# Patient Record
Sex: FEMALE | Race: WHITE | NOT HISPANIC OR LATINO | Employment: OTHER | URBAN - METROPOLITAN AREA
[De-identification: names, ages, dates, MRNs, and addresses within clinical notes are randomized per-mention and may not be internally consistent; named-entity substitution may affect disease eponyms.]

---

## 2018-05-16 ENCOUNTER — PATIENT OUTREACH (OUTPATIENT)
Dept: ADMINISTRATIVE | Facility: HOSPITAL | Age: 63
End: 2018-05-16

## 2018-05-16 NOTE — PROGRESS NOTES
Health Maintenance Due   Topic Date Due    Hepatitis C Screening  1955    Lipid Panel  1955    TETANUS VACCINE  12/01/1975    Mammogram  12/01/1997    Colonoscopy  12/01/2007    Zoster Vaccine  12/01/2017     Pre-visit outreach via mail

## 2018-05-16 NOTE — LETTER
May 16, 2018    Arti Cortés  [address] [facility] [address]             [facility]  [address]  [facility] [address]  Phone: [phone] Dear Ms. Cortés:    [facility] is committed to your overall health.  To help you get the most out of each of your visits, we will review your information to make sure you are up to date on all of your recommended tests and/or procedures.      Dr. Balalrd   has found that your chart shows you may be due for the following:    One-time Hepatitis C Screening lab test(a viral condition that can harm the liver)  Fasting cholesterol labs  Tetanus Immunization  Mammogram  Colon cancer screening  Shingles immunization    If you have had any of the above done at another facility, please bring the records or information with you so that your record at [facility] will be complete.  If you would like to schedule any of these, please contact me.    If you are currently taking medication, please bring it with you to your appointment for review.    If you have any questions or concerns, please don't hesitate to call.    Sincerely,    Perla Retana  Clinical Care Coordinator  [facility]  [address] [place] [address]  Phone: [phone]   Fax: [phone]

## 2018-05-29 ENCOUNTER — TELEPHONE (OUTPATIENT)
Dept: FAMILY MEDICINE | Facility: CLINIC | Age: 63
End: 2018-05-29

## 2018-10-05 ENCOUNTER — OFFICE VISIT (OUTPATIENT)
Dept: URGENT CARE | Facility: CLINIC | Age: 63
End: 2018-10-05
Payer: COMMERCIAL

## 2018-10-05 VITALS
DIASTOLIC BLOOD PRESSURE: 90 MMHG | RESPIRATION RATE: 18 BRPM | WEIGHT: 184 LBS | HEART RATE: 59 BPM | HEIGHT: 64 IN | OXYGEN SATURATION: 96 % | TEMPERATURE: 98 F | SYSTOLIC BLOOD PRESSURE: 148 MMHG | BODY MASS INDEX: 31.41 KG/M2

## 2018-10-05 DIAGNOSIS — J20.9 ACUTE BRONCHITIS WITH BRONCHOSPASM: Primary | ICD-10-CM

## 2018-10-05 PROCEDURE — 94640 AIRWAY INHALATION TREATMENT: CPT | Mod: S$GLB,,, | Performed by: FAMILY MEDICINE

## 2018-10-05 PROCEDURE — 3008F BODY MASS INDEX DOCD: CPT | Mod: CPTII,S$GLB,, | Performed by: FAMILY MEDICINE

## 2018-10-05 PROCEDURE — 99214 OFFICE O/P EST MOD 30 MIN: CPT | Mod: 25,S$GLB,, | Performed by: FAMILY MEDICINE

## 2018-10-05 PROCEDURE — 96372 THER/PROPH/DIAG INJ SC/IM: CPT | Mod: 59,S$GLB,, | Performed by: FAMILY MEDICINE

## 2018-10-05 RX ORDER — BETAMETHASONE SODIUM PHOSPHATE AND BETAMETHASONE ACETATE 3; 3 MG/ML; MG/ML
6 INJECTION, SUSPENSION INTRA-ARTICULAR; INTRALESIONAL; INTRAMUSCULAR; SOFT TISSUE
Status: COMPLETED | OUTPATIENT
Start: 2018-10-05 | End: 2018-10-05

## 2018-10-05 RX ORDER — ALBUTEROL SULFATE 0.83 MG/ML
2.5 SOLUTION RESPIRATORY (INHALATION)
Status: COMPLETED | OUTPATIENT
Start: 2018-10-05 | End: 2018-10-05

## 2018-10-05 RX ORDER — PREDNISONE 20 MG/1
40 TABLET ORAL DAILY
Qty: 10 TABLET | Refills: 0 | Status: SHIPPED | OUTPATIENT
Start: 2018-10-05 | End: 2018-10-10

## 2018-10-05 RX ORDER — ALBUTEROL SULFATE 90 UG/1
2 AEROSOL, METERED RESPIRATORY (INHALATION) EVERY 4 HOURS PRN
Qty: 18 G | Refills: 3 | Status: SHIPPED | OUTPATIENT
Start: 2018-10-05 | End: 2023-01-01 | Stop reason: CLARIF

## 2018-10-05 RX ORDER — IPRATROPIUM BROMIDE 0.5 MG/2.5ML
0.5 SOLUTION RESPIRATORY (INHALATION)
Status: COMPLETED | OUTPATIENT
Start: 2018-10-05 | End: 2018-10-05

## 2018-10-05 RX ORDER — DOXYCYCLINE 100 MG/1
100 CAPSULE ORAL 2 TIMES DAILY
Qty: 20 CAPSULE | Refills: 0 | Status: SHIPPED | OUTPATIENT
Start: 2018-10-05 | End: 2018-10-15

## 2018-10-05 RX ADMIN — ALBUTEROL SULFATE 2.5 MG: 0.83 SOLUTION RESPIRATORY (INHALATION) at 03:10

## 2018-10-05 RX ADMIN — BETAMETHASONE SODIUM PHOSPHATE AND BETAMETHASONE ACETATE 6 MG: 3; 3 INJECTION, SUSPENSION INTRA-ARTICULAR; INTRALESIONAL; INTRAMUSCULAR; SOFT TISSUE at 03:10

## 2018-10-05 RX ADMIN — IPRATROPIUM BROMIDE 0.5 MG: 0.5 SOLUTION RESPIRATORY (INHALATION) at 03:10

## 2018-10-05 NOTE — PATIENT INSTRUCTIONS
Bronchitis, Antibiotic Treatment (Adult)    Bronchitis is an infection of the air passages (bronchial tubes) in your lungs. It often occurs when you have a cold. This illness is contagious during the first few days and is spread through the air by coughing and sneezing, or by direct contact (touching the sick person and then touching your own eyes, nose, or mouth).  Symptoms of bronchitis include cough with mucus (phlegm) and low-grade fever. Bronchitis usually lasts 7 to 14 days. Mild cases can be treated with simple home remedies. More severe infection is treated with an antibiotic.  Home care  Follow these guidelines when caring for yourself at home:  · If your symptoms are severe, rest at home for the first 2 to 3 days. When you go back to your usual activities, don't let yourself get too tired.  · Do not smoke. Also avoid being exposed to secondhand smoke.  · You may use over-the-counter medicines to control fever or pain, unless another medicine was prescribed. (Note: If you have chronic liver or kidney disease or have ever had a stomach ulcer or gastrointestinal bleeding, talk with your healthcare provider before using these medicines. Also talk to your provider if you are taking medicine to prevent blood clots.) Aspirin should never be given to anyone younger than 18 years of age who is ill with a viral infection or fever. It may cause severe liver or brain damage.  · Your appetite may be poor, so a light diet is fine. Avoid dehydration by drinking 6 to 8 glasses of fluids per day (such as water, soft drinks, sports drinks, juices, tea, or soup). Extra fluids will help loosen secretions in the nose and lungs.  · Over-the-counter cough, cold, and sore-throat medicines will not shorten the length of the illness, but they may be helpful to reduce symptoms. (Note: Do not use decongestants if you have high blood pressure.)  · Finish all antibiotic medicine. Do this even if you are feeling better after only a  few days.  Follow-up care  Follow up with your healthcare provider, or as advised. If you had an X-ray or ECG (electrocardiogram), a specialist will review it. You will be notified of any new findings that may affect your care.  Note: If you are age 65 or older, or if you have a chronic lung disease or condition that affects your immune system, or you smoke, talk to your healthcare provider about having pneumococcal vaccinations and a yearly influenza vaccination (flu shot).  When to seek medical advice  Call your healthcare provider right away if any of these occur:  · Fever of 100.4°F (38°C) or higher  · Coughing up increased amounts of colored sputum  · Weakness, drowsiness, headache, facial pain, ear pain, or a stiff neck  Call 911, or get immediate medical care  Contact emergency services right away if any of these occur.  · Coughing up blood  · Worsening weakness, drowsiness, headache, or stiff neck  · Trouble breathing, wheezing, or pain with breathing  Date Last Reviewed: 9/13/2015  © 7184-1524 [employer]. [address] [place] [address], [place] [address]. All rights reserved. This information is not intended as a substitute for professional medical care. Always follow your healthcare professional's instructions.

## 2018-10-05 NOTE — PROGRESS NOTES
"Subjective:       Patient ID: Arti Cortés is a 62 y.o. female.    Vitals:  height is 5' 4" (1.626 m) and weight is 83.5 kg (184 lb). Her temperature is 97.9 °F (36.6 °C). Her blood pressure is 148/90 (abnormal) and her pulse is 59 (abnormal). Her respiration is 18 and oxygen saturation is 96%.     Chief Complaint: URI (pt has been sick for 2 days)    URI    This is a new problem. The current episode started in the past 7 days. The problem has been gradually worsening. There has been no fever. Associated symptoms include coughing and headaches. Pertinent negatives include no abdominal pain, chest pain, congestion, ear pain, nausea, sore throat or wheezing. She has tried acetaminophen for the symptoms. The treatment provided mild relief.     Review of Systems   Constitution: Negative for chills, fever and malaise/fatigue.   HENT: Negative for congestion, ear pain, hoarse voice and sore throat.    Eyes: Negative for discharge and redness.   Cardiovascular: Negative for chest pain, dyspnea on exertion and leg swelling.   Respiratory: Positive for cough and shortness of breath. Negative for sputum production and wheezing.    Musculoskeletal: Negative for myalgias.   Gastrointestinal: Negative for abdominal pain and nausea.   Neurological: Positive for headaches.       Objective:      Physical Exam   Constitutional: She is oriented to person, place, and time. She appears well-developed and well-nourished. She is cooperative.  Non-toxic appearance. She does not appear ill. No distress.   HENT:   Head: Normocephalic and atraumatic.   Right Ear: Hearing, tympanic membrane, external ear and ear canal normal.   Left Ear: Hearing, tympanic membrane, external ear and ear canal normal.   Nose: Nose normal. No mucosal edema, rhinorrhea or nasal deformity. No epistaxis. Right sinus exhibits no maxillary sinus tenderness and no frontal sinus tenderness. Left sinus exhibits no maxillary sinus tenderness and no frontal sinus " tenderness.   Mouth/Throat: Uvula is midline, oropharynx is clear and moist and mucous membranes are normal. No trismus in the jaw. Normal dentition. No uvula swelling. No posterior oropharyngeal erythema.   Eyes: Conjunctivae and lids are normal. No scleral icterus.   Sclera clear bilat   Neck: Trachea normal, full passive range of motion without pain and phonation normal. Neck supple.   Cardiovascular: Normal rate, regular rhythm, normal heart sounds, intact distal pulses and normal pulses.   Pulmonary/Chest: Effort normal. She has wheezes. She has no rales. She exhibits no tenderness.   Abdominal: Soft. Normal appearance and bowel sounds are normal. She exhibits no distension. There is no tenderness.   Musculoskeletal: Normal range of motion. She exhibits no edema or deformity.   Neurological: She is alert and oriented to person, place, and time. She exhibits normal muscle tone. Coordination normal.   Skin: Skin is warm, dry and intact. She is not diaphoretic. No pallor.   Psychiatric: She has a normal mood and affect. Her speech is normal and behavior is normal. Judgment and thought content normal. Cognition and memory are normal.   Nursing note and vitals reviewed.      Assessment:       1. Acute bronchitis with bronchospasm        Plan:         Acute bronchitis with bronchospasm  -     Ambulatory referral to Smoking Cessation Program    Other orders  -     albuterol nebulizer solution 2.5 mg; Take 3 mLs (2.5 mg total) by nebulization one time.  -     ipratropium 0.02 % nebulizer solution 0.5 mg; Take 2.5 mLs (0.5 mg total) by nebulization one time.  -     betamethasone acetate-betamethasone sodium phosphate injection 6 mg; Inject 1 mL (6 mg total) into the muscle one time.  -     doxycycline (VIBRAMYCIN) 100 MG Cap; Take 1 capsule (100 mg total) by mouth 2 (two) times daily. for 10 days  Dispense: 20 capsule; Refill: 0  -     predniSONE (DELTASONE) 20 MG tablet; Take 2 tablets (40 mg total) by mouth once  daily. for 5 days  Dispense: 10 tablet; Refill: 0  -     albuterol (PROVENTIL/VENTOLIN HFA) 90 mcg/actuation inhaler; Inhale 2 puffs into the lungs every 4 (four) hours as needed for Wheezing. Rescue  Dispense: 18 g; Refill: 3

## 2019-04-11 ENCOUNTER — OFFICE VISIT (OUTPATIENT)
Dept: URGENT CARE | Facility: CLINIC | Age: 64
End: 2019-04-11
Payer: COMMERCIAL

## 2019-04-11 VITALS
DIASTOLIC BLOOD PRESSURE: 73 MMHG | SYSTOLIC BLOOD PRESSURE: 103 MMHG | WEIGHT: 184 LBS | OXYGEN SATURATION: 95 % | RESPIRATION RATE: 18 BRPM | TEMPERATURE: 99 F | HEART RATE: 72 BPM | BODY MASS INDEX: 31.41 KG/M2 | HEIGHT: 64 IN

## 2019-04-11 DIAGNOSIS — R05.9 COUGH: ICD-10-CM

## 2019-04-11 DIAGNOSIS — J01.90 ACUTE RHINOSINUSITIS: ICD-10-CM

## 2019-04-11 DIAGNOSIS — R68.89 FLU-LIKE SYMPTOMS: Primary | ICD-10-CM

## 2019-04-11 LAB
CTP QC/QA: YES
FLUAV AG NPH QL: NEGATIVE
FLUBV AG NPH QL: NEGATIVE

## 2019-04-11 PROCEDURE — 3008F BODY MASS INDEX DOCD: CPT | Mod: CPTII,S$GLB,, | Performed by: NURSE PRACTITIONER

## 2019-04-11 PROCEDURE — 96372 PR INJECTION,THERAP/PROPH/DIAG2ST, IM OR SUBCUT: ICD-10-PCS | Mod: S$GLB,,, | Performed by: NURSE PRACTITIONER

## 2019-04-11 PROCEDURE — 87804 POCT INFLUENZA A/B: ICD-10-PCS | Mod: 59,QW,S$GLB, | Performed by: NURSE PRACTITIONER

## 2019-04-11 PROCEDURE — 99214 PR OFFICE/OUTPT VISIT, EST, LEVL IV, 30-39 MIN: ICD-10-PCS | Mod: 25,S$GLB,, | Performed by: NURSE PRACTITIONER

## 2019-04-11 PROCEDURE — 3008F PR BODY MASS INDEX (BMI) DOCUMENTED: ICD-10-PCS | Mod: CPTII,S$GLB,, | Performed by: NURSE PRACTITIONER

## 2019-04-11 PROCEDURE — 87804 INFLUENZA ASSAY W/OPTIC: CPT | Mod: QW,S$GLB,, | Performed by: NURSE PRACTITIONER

## 2019-04-11 PROCEDURE — 99214 OFFICE O/P EST MOD 30 MIN: CPT | Mod: 25,S$GLB,, | Performed by: NURSE PRACTITIONER

## 2019-04-11 PROCEDURE — 96372 THER/PROPH/DIAG INJ SC/IM: CPT | Mod: S$GLB,,, | Performed by: NURSE PRACTITIONER

## 2019-04-11 RX ORDER — FLUTICASONE PROPIONATE 50 MCG
1 SPRAY, SUSPENSION (ML) NASAL DAILY
Qty: 1 BOTTLE | Refills: 0 | Status: SHIPPED | OUTPATIENT
Start: 2019-04-11 | End: 2023-01-01 | Stop reason: CLARIF

## 2019-04-11 RX ORDER — PREDNISONE 20 MG/1
20 TABLET ORAL DAILY
Qty: 5 TABLET | Refills: 0 | Status: SHIPPED | OUTPATIENT
Start: 2019-04-11 | End: 2019-04-16

## 2019-04-11 RX ORDER — BETAMETHASONE SODIUM PHOSPHATE AND BETAMETHASONE ACETATE 3; 3 MG/ML; MG/ML
6 INJECTION, SUSPENSION INTRA-ARTICULAR; INTRALESIONAL; INTRAMUSCULAR; SOFT TISSUE
Status: COMPLETED | OUTPATIENT
Start: 2019-04-11 | End: 2019-04-11

## 2019-04-11 RX ADMIN — BETAMETHASONE SODIUM PHOSPHATE AND BETAMETHASONE ACETATE 6 MG: 3; 3 INJECTION, SUSPENSION INTRA-ARTICULAR; INTRALESIONAL; INTRAMUSCULAR; SOFT TISSUE at 10:04

## 2019-04-11 NOTE — LETTER
April 11, 2019      [facility] [address], [address]  [facility] [address]  Phone: [phone]  Fax: [phone]       Patient: Arti Cortés   YOB: 1955  Date of Visit: 04/11/2019    To Whom It May Concern:    Florian Cortés  was at [facility] on 04/11/2019. She may return to work/school on 4/15/2019  with no restrictions. If you have any questions or concerns, or if I can be of further assistance, please do not hesitate to contact me.    Sincerely,        Chery Tracey NP

## 2019-04-11 NOTE — PATIENT INSTRUCTIONS
Follow up with your doctor in a few days.  Return to the urgent care or go to the ER if symptoms get worse.    You received a steroid shot today - this can elevate your blood pressure, elevate your blood sugar, water weight gain, nervous energy, redness to the face and dimpling of the skin where the shot goes in.     START ORAL STEROID TOMORROW.      WARM SALT WATER GARGLES FOR THROAT DISCOMFORT.    TYLENOL EVERY 4 HOURS OR/AND MOTRIN 600MG EVERY 6 HOURS FOR THROAT PAIN/FEVER.    TAKE DAILY ANTIHISTAMINE WITH DECONGESTANT (CLARATIN D, ALLEGRA D, ZYRTEC D) FOR THE NEXT 7-10 DAYS. Take in daytime.     TAKE DAILY FLONASE AS DIRECTED FOR THE NEXT 7-10 DAYS FOR CONGESTION.    CONTINUE MUCINCEX DM FOR CHEST CONGESTION. (GUAFINASEN)       SEE HANDOUT ON SINUSITUS AND ALLERGIC RHINITIS.    IF SYMPTOMS FAIL TO IMPROVE IN THE NEXT 5-7 DAYS, OR IMPROVE AND THEN WORSEN, FOLLOW UP WITH A HEALTHCARE PROVIDER.        Sinusitis (No Antibiotics)    The sinuses are air-filled spaces within the bones of the face. They connect to the inside of the nose. Sinusitis is an inflammation of the tissue lining the sinus cavity. Sinus inflammation can occur during a cold. It can also be due to allergies to pollens and other particles in the air. It can cause symptoms such as sinus congestion, headache, sore throat, facial swelling and fullness. It may also cause a low-grade fever. No infection is present, and no antibiotic treatment is needed.  Home care  · Drink plenty of water, hot tea, and other liquids. This may help thin mucus. It also may promote sinus drainage.  · Heat may help soothe painful areas of the face. Use a towel soaked in hot water. Or,  the shower and direct the hot spray onto your face. Using a vaporizer along with a menthol rub at night may also help.   · An expectorant containing guaifenesin may help thin the mucus and promote drainage from the sinuses.  · Over-the-counter decongestants may be used unless a similar  medicine was prescribed. Nasal sprays work the fastest. Use one that contains phenylephrine or oxymetazoline. First blow the nose gently. Then use the spray. Do not use these medicines more often than directed on the label or symptoms may get worse. You may also use tablets containing pseudoephedrine. Avoid products that combine ingredients, because side effects may be increased. Read labels. You can also ask the pharmacist for help. (NOTE: Persons with high blood pressure should not use decongestants. They can raise blood pressure.)  · Over-the-counter antihistamines may help if allergies contributed to your sinusitis.    · Use acetaminophen or ibuprofen to control pain, unless another pain medicine was prescribed. (If you have chronic liver or kidney disease or ever had a stomach ulcer, talk with your doctor before using these medicines. Aspirin should never be used in anyone under 18 years of age who is ill with a fever. It may cause severe liver damage.)  · Use nasal rinses or irrigation as instructed by your health care provider.  · Don't smoke. This can worsen symptoms.  Follow-up care  Follow up with your healthcare provider or our staff if you are not improving within the next week.  When to seek medical advice  Call your healthcare provider if any of these occur:  · Green or yellow discharge from the nose or into the throat  · Facial pain or headache becoming more severe  · Stiff neck  · Unusual drowsiness or confusion  · Swelling of the forehead or eyelids  · Vision problems, including blurred or double vision  · Fever of 100.4ºF (38ºC) or higher, or as directed by your healthcare provider  · Seizure  · Breathing problems  · Symptoms not resolving within 10 days  Date Last Reviewed: 4/13/2015  © 5367-4577 [employer]. [address] [place] [address], [place] [address]. All rights reserved. This information is not intended as a substitute for professional medical care. Always follow your healthcare  professional's instructions.

## 2019-04-11 NOTE — PROGRESS NOTES
"Subjective:       Patient ID: Arti Cortés is a 63 y.o. female.    Vitals:  height is 5' 4" (1.626 m) and weight is 83.5 kg (184 lb). Her oral temperature is 98.8 °F (37.1 °C). Her blood pressure is 103/73 and her pulse is 72. Her respiration is 18 and oxygen saturation is 95%.     Chief Complaint: Cough (X's 1 week); Sore Throat; and Fever    Pt presents today with cough, sore throat and low grade fever X's 1 week. EVERYDAY SMOKER. DENIES PRODUCTIVE COUGHING. POSITIVE FOR DRY COUGH AND SINUS PRESSURE/HEADACHE, CONGESTION. SHE DID NOT MEASURE HER TEMPERATURE-FELT "FEVERISH". DENIES SOB, WHEEZING.    Cough   This is a new problem. The current episode started in the past 7 days. The problem has been gradually worsening. The problem occurs constantly. The cough is non-productive. Associated symptoms include a fever and a sore throat. Pertinent negatives include no chills, ear pain, eye redness, hemoptysis, myalgias, rash, shortness of breath or wheezing. Nothing aggravates the symptoms. She has tried OTC cough suppressant for the symptoms. The treatment provided no relief. Her past medical history is significant for asthma.   Sore Throat    Associated symptoms include coughing. Pertinent negatives include no congestion, ear pain, shortness of breath, stridor or vomiting.   Fever    Associated symptoms include coughing and a sore throat. Pertinent negatives include no congestion, ear pain, nausea, rash, vomiting or wheezing.       Constitution: Positive for fatigue and fever. Negative for chills and sweating.   HENT: Positive for sore throat. Negative for ear pain, congestion, sinus pain, sinus pressure and voice change.    Neck: Negative for painful lymph nodes.   Eyes: Negative for eye redness.   Respiratory: Positive for cough. Negative for chest tightness, sputum production, bloody sputum, COPD, shortness of breath, stridor, wheezing and asthma.    Gastrointestinal: Negative for nausea and vomiting. "   Musculoskeletal: Negative for muscle ache.   Skin: Negative for rash.   Allergic/Immunologic: Negative for seasonal allergies and asthma.   Hematologic/Lymphatic: Negative for swollen lymph nodes.       Objective:      Physical Exam   Constitutional: She is oriented to person, place, and time. She appears well-developed and well-nourished. She is cooperative.  Non-toxic appearance. She does not appear ill. No distress.   HENT:   Head: Normocephalic and atraumatic.   Right Ear: Hearing, tympanic membrane, external ear and ear canal normal.   Left Ear: Hearing, tympanic membrane, external ear and ear canal normal.   Nose: Mucosal edema and rhinorrhea present. No nasal deformity. No epistaxis. Right sinus exhibits no maxillary sinus tenderness and no frontal sinus tenderness. Left sinus exhibits no maxillary sinus tenderness and no frontal sinus tenderness.   Mouth/Throat: Uvula is midline and mucous membranes are normal. No trismus in the jaw. Normal dentition. No uvula swelling. Posterior oropharyngeal edema and posterior oropharyngeal erythema present. No oropharyngeal exudate. No tonsillar exudate.   Eyes: Conjunctivae and lids are normal. No scleral icterus.   Sclera clear bilat   Neck: Trachea normal, full passive range of motion without pain and phonation normal. Neck supple.   Cardiovascular: Normal rate, regular rhythm, normal heart sounds, intact distal pulses and normal pulses.   Pulmonary/Chest: Effort normal and breath sounds normal. No stridor. No respiratory distress. She has no decreased breath sounds. She has no wheezes. She has no rhonchi.   Abdominal: Soft. Normal appearance and bowel sounds are normal. She exhibits no distension. There is no tenderness.   Musculoskeletal: Normal range of motion. She exhibits no edema or deformity.   Lymphadenopathy:     She has cervical adenopathy.        Right cervical: Superficial cervical adenopathy present.        Left cervical: Superficial cervical adenopathy  present.   Neurological: She is alert and oriented to person, place, and time. She exhibits normal muscle tone. Coordination normal.   Skin: Skin is warm, dry and intact. She is not diaphoretic. No pallor.   Psychiatric: She has a normal mood and affect. Her speech is normal and behavior is normal. Judgment and thought content normal. Cognition and memory are normal.   Nursing note and vitals reviewed.      Assessment:       1. Flu-like symptoms    2. Acute rhinosinusitis    3. Cough        Plan:         Flu-like symptoms  -     POCT Influenza A/B    Acute rhinosinusitis  -     fluticasone (FLONASE) 50 mcg/actuation nasal spray; 1 spray (50 mcg total) by Each Nare route once daily.  Dispense: 1 Bottle; Refill: 0  -     betamethasone acetate-betamethasone sodium phosphate injection 6 mg  -     predniSONE (DELTASONE) 20 MG tablet; Take 1 tablet (20 mg total) by mouth once daily. for 5 days  Dispense: 5 tablet; Refill: 0    Cough      Patient Instructions   Follow up with your doctor in a few days.  Return to the urgent care or go to the ER if symptoms get worse.    You received a steroid shot today - this can elevate your blood pressure, elevate your blood sugar, water weight gain, nervous energy, redness to the face and dimpling of the skin where the shot goes in.     START ORAL STEROID TOMORROW.      WARM SALT WATER GARGLES FOR THROAT DISCOMFORT.    TYLENOL EVERY 4 HOURS OR/AND MOTRIN 600MG EVERY 6 HOURS FOR THROAT PAIN/FEVER.    TAKE DAILY ANTIHISTAMINE WITH DECONGESTANT (CLARATIN D, ALLEGRA D, ZYRTEC D) FOR THE NEXT 7-10 DAYS. Take in daytime.     TAKE DAILY FLONASE AS DIRECTED FOR THE NEXT 7-10 DAYS FOR CONGESTION.    CONTINUE MUCINCEX DM FOR CHEST CONGESTION. (GUAFINASEN)       SEE HANDOUT ON SINUSITUS AND ALLERGIC RHINITIS.    IF SYMPTOMS FAIL TO IMPROVE IN THE NEXT 5-7 DAYS, OR IMPROVE AND THEN WORSEN, FOLLOW UP WITH A HEALTHCARE PROVIDER.        Sinusitis (No Antibiotics)    The sinuses are air-filled spaces  within the bones of the face. They connect to the inside of the nose. Sinusitis is an inflammation of the tissue lining the sinus cavity. Sinus inflammation can occur during a cold. It can also be due to allergies to pollens and other particles in the air. It can cause symptoms such as sinus congestion, headache, sore throat, facial swelling and fullness. It may also cause a low-grade fever. No infection is present, and no antibiotic treatment is needed.  Home care  · Drink plenty of water, hot tea, and other liquids. This may help thin mucus. It also may promote sinus drainage.  · Heat may help soothe painful areas of the face. Use a towel soaked in hot water. Or,  the shower and direct the hot spray onto your face. Using a vaporizer along with a menthol rub at night may also help.   · An expectorant containing guaifenesin may help thin the mucus and promote drainage from the sinuses.  · Over-the-counter decongestants may be used unless a similar medicine was prescribed. Nasal sprays work the fastest. Use one that contains phenylephrine or oxymetazoline. First blow the nose gently. Then use the spray. Do not use these medicines more often than directed on the label or symptoms may get worse. You may also use tablets containing pseudoephedrine. Avoid products that combine ingredients, because side effects may be increased. Read labels. You can also ask the pharmacist for help. (NOTE: Persons with high blood pressure should not use decongestants. They can raise blood pressure.)  · Over-the-counter antihistamines may help if allergies contributed to your sinusitis.    · Use acetaminophen or ibuprofen to control pain, unless another pain medicine was prescribed. (If you have chronic liver or kidney disease or ever had a stomach ulcer, talk with your doctor before using these medicines. Aspirin should never be used in anyone under 18 years of age who is ill with a fever. It may cause severe liver damage.)  · Use  nasal rinses or irrigation as instructed by your health care provider.  · Don't smoke. This can worsen symptoms.  Follow-up care  Follow up with your healthcare provider or our staff if you are not improving within the next week.  When to seek medical advice  Call your healthcare provider if any of these occur:  · Green or yellow discharge from the nose or into the throat  · Facial pain or headache becoming more severe  · Stiff neck  · Unusual drowsiness or confusion  · Swelling of the forehead or eyelids  · Vision problems, including blurred or double vision  · Fever of 100.4ºF (38ºC) or higher, or as directed by your healthcare provider  · Seizure  · Breathing problems  · Symptoms not resolving within 10 days  Date Last Reviewed: 4/13/2015  © 5174-8636 [employer], [employer]. [address] [place] [address], [place] [address]. All rights reserved. This information is not intended as a substitute for professional medical care. Always follow your healthcare professional's instructions.

## 2019-04-18 ENCOUNTER — OFFICE VISIT (OUTPATIENT)
Dept: URGENT CARE | Facility: CLINIC | Age: 64
End: 2019-04-18
Payer: COMMERCIAL

## 2019-04-18 VITALS
BODY MASS INDEX: 31.41 KG/M2 | WEIGHT: 184 LBS | HEART RATE: 74 BPM | SYSTOLIC BLOOD PRESSURE: 109 MMHG | RESPIRATION RATE: 18 BRPM | TEMPERATURE: 98 F | HEIGHT: 64 IN | OXYGEN SATURATION: 96 % | DIASTOLIC BLOOD PRESSURE: 65 MMHG

## 2019-04-18 DIAGNOSIS — F17.200 SMOKER: ICD-10-CM

## 2019-04-18 DIAGNOSIS — J20.9 ACUTE BRONCHITIS WITH BRONCHOSPASM: Primary | ICD-10-CM

## 2019-04-18 PROCEDURE — 96372 THER/PROPH/DIAG INJ SC/IM: CPT | Mod: 59,S$GLB,, | Performed by: NURSE PRACTITIONER

## 2019-04-18 PROCEDURE — 94640 AIRWAY INHALATION TREATMENT: CPT | Mod: S$GLB,,, | Performed by: NURSE PRACTITIONER

## 2019-04-18 PROCEDURE — 94640 PR INHAL RX, AIRWAY OBST/DX SPUTUM INDUCT: ICD-10-PCS | Mod: S$GLB,,, | Performed by: NURSE PRACTITIONER

## 2019-04-18 PROCEDURE — 96372 PR INJECTION,THERAP/PROPH/DIAG2ST, IM OR SUBCUT: ICD-10-PCS | Mod: 59,S$GLB,, | Performed by: NURSE PRACTITIONER

## 2019-04-18 PROCEDURE — 3008F PR BODY MASS INDEX (BMI) DOCUMENTED: ICD-10-PCS | Mod: CPTII,S$GLB,, | Performed by: NURSE PRACTITIONER

## 2019-04-18 PROCEDURE — 3008F BODY MASS INDEX DOCD: CPT | Mod: CPTII,S$GLB,, | Performed by: NURSE PRACTITIONER

## 2019-04-18 PROCEDURE — 99214 OFFICE O/P EST MOD 30 MIN: CPT | Mod: 25,S$GLB,, | Performed by: NURSE PRACTITIONER

## 2019-04-18 PROCEDURE — 99214 PR OFFICE/OUTPT VISIT, EST, LEVL IV, 30-39 MIN: ICD-10-PCS | Mod: 25,S$GLB,, | Performed by: NURSE PRACTITIONER

## 2019-04-18 RX ORDER — ALBUTEROL SULFATE 0.83 MG/ML
2.5 SOLUTION RESPIRATORY (INHALATION)
Status: COMPLETED | OUTPATIENT
Start: 2019-04-18 | End: 2019-04-18

## 2019-04-18 RX ORDER — DOXYCYCLINE 100 MG/1
100 CAPSULE ORAL 2 TIMES DAILY
Qty: 20 CAPSULE | Refills: 0 | Status: SHIPPED | OUTPATIENT
Start: 2019-04-18 | End: 2019-04-28

## 2019-04-18 RX ORDER — IPRATROPIUM BROMIDE 0.5 MG/2.5ML
0.5 SOLUTION RESPIRATORY (INHALATION)
Status: COMPLETED | OUTPATIENT
Start: 2019-04-18 | End: 2019-04-18

## 2019-04-18 RX ORDER — PREDNISONE 20 MG/1
40 TABLET ORAL
Status: COMPLETED | OUTPATIENT
Start: 2019-04-18 | End: 2019-04-18

## 2019-04-18 RX ORDER — BENZONATATE 200 MG/1
200 CAPSULE ORAL 3 TIMES DAILY PRN
Qty: 30 CAPSULE | Refills: 0 | Status: SHIPPED | OUTPATIENT
Start: 2019-04-18 | End: 2019-04-28

## 2019-04-18 RX ORDER — PREDNISONE 20 MG/1
40 TABLET ORAL DAILY
Qty: 6 TABLET | Refills: 0 | Status: SHIPPED | OUTPATIENT
Start: 2019-04-19 | End: 2019-04-22

## 2019-04-18 RX ADMIN — ALBUTEROL SULFATE 2.5 MG: 0.83 SOLUTION RESPIRATORY (INHALATION) at 09:04

## 2019-04-18 RX ADMIN — IPRATROPIUM BROMIDE 0.5 MG: 0.5 SOLUTION RESPIRATORY (INHALATION) at 09:04

## 2019-04-18 RX ADMIN — PREDNISONE 40 MG: 20 TABLET ORAL at 09:04

## 2019-04-18 NOTE — PATIENT INSTRUCTIONS
Follow up with your doctor in a few days.  Return to the urgent care or go to the ER if symptoms get worse.    Follow up with your doctor in a few days.  Return to the urgent care or go to the ER if symptoms get worse.    START ORAL STEROID TOMORROW.  START ANTIBIOTICS TODAY (DOXYCYCLINE) AS DIRECTED.    COUGH: MUCINEX DM AS DIRECTED.  TESSALON DURING THE DAY FOR COUGH CONTROL       WARM SALT WATER GARGLES FOR THROAT DISCOMFORT.    TYLENOL EVERY 4 HOURS OR/AND MOTRIN 600MG EVERY 6 HOURS FOR THROAT PAIN/FEVER.    SINUS CONGESTION:    TAKE DAILY ANTIHISTAMINE WITH DECONGESTANT (CLARATIN D, ALLEGRA D, ZYRTEC D) FOR THE NEXT 7-10 DAYS    TAKE DAILY FLONASE AS DIRECTED FOR THE NEXT 7-10 DAYS FOR CONGESTION.    SEE HANDOUT ON SINUSITUS AND BRONCHITIS      IF SYMPTOMS FAIL TO IMPROVE IN THE NEXT 5-7 DAYS, OR IMPROVE AND THEN WORSEN, FOLLOW UP WITH A HEALTHCARE PROVIDER.            Sinusitis (No Antibiotics)    The sinuses are air-filled spaces within the bones of the face. They connect to the inside of the nose. Sinusitis is an inflammation of the tissue lining the sinus cavity. Sinus inflammation can occur during a cold. It can also be due to allergies to pollens and other particles in the air. It can cause symptoms such as sinus congestion, headache, sore throat, facial swelling and fullness. It may also cause a low-grade fever. No infection is present, and no antibiotic treatment is needed.  Home care  · Drink plenty of water, hot tea, and other liquids. This may help thin mucus. It also may promote sinus drainage.  · Heat may help soothe painful areas of the face. Use a towel soaked in hot water. Or,  the shower and direct the hot spray onto your face. Using a vaporizer along with a menthol rub at night may also help.   · An expectorant containing guaifenesin may help thin the mucus and promote drainage from the sinuses.  · Over-the-counter decongestants may be used unless a similar medicine was prescribed.  Nasal sprays work the fastest. Use one that contains phenylephrine or oxymetazoline. First blow the nose gently. Then use the spray. Do not use these medicines more often than directed on the label or symptoms may get worse. You may also use tablets containing pseudoephedrine. Avoid products that combine ingredients, because side effects may be increased. Read labels. You can also ask the pharmacist for help. (NOTE: Persons with high blood pressure should not use decongestants. They can raise blood pressure.)  · Over-the-counter antihistamines may help if allergies contributed to your sinusitis.    · Use acetaminophen or ibuprofen to control pain, unless another pain medicine was prescribed. (If you have chronic liver or kidney disease or ever had a stomach ulcer, talk with your doctor before using these medicines. Aspirin should never be used in anyone under 18 years of age who is ill with a fever. It may cause severe liver damage.)  · Use nasal rinses or irrigation as instructed by your health care provider.  · Don't smoke. This can worsen symptoms.  Follow-up care  Follow up with your healthcare provider or our staff if you are not improving within the next week.  When to seek medical advice  Call your healthcare provider if any of these occur:  · Green or yellow discharge from the nose or into the throat  · Facial pain or headache becoming more severe  · Stiff neck  · Unusual drowsiness or confusion  · Swelling of the forehead or eyelids  · Vision problems, including blurred or double vision  · Fever of 100.4ºF (38ºC) or higher, or as directed by your healthcare provider  · Seizure  · Breathing problems  · Symptoms not resolving within 10 days  Date Last Reviewed: 4/13/2015 © 2000-2017 [email or web address]. [address] [place] [address], [place] [address]. All rights reserved. This information is not intended as a substitute for professional medical care. Always follow your healthcare professional's  instructions.      Bronchitis, Antibiotic Treatment (Adult)    Bronchitis is an infection of the air passages (bronchial tubes) in your lungs. It often occurs when you have a cold. This illness is contagious during the first few days and is spread through the air by coughing and sneezing, or by direct contact (touching the sick person and then touching your own eyes, nose, or mouth).  Symptoms of bronchitis include cough with mucus (phlegm) and low-grade fever. Bronchitis usually lasts 7 to 14 days. Mild cases can be treated with simple home remedies. More severe infection is treated with an antibiotic.  Home care  Follow these guidelines when caring for yourself at home:  · If your symptoms are severe, rest at home for the first 2 to 3 days. When you go back to your usual activities, don't let yourself get too tired.  · Do not smoke. Also avoid being exposed to secondhand smoke.  · You may use over-the-counter medicines to control fever or pain, unless another medicine was prescribed. (Note: If you have chronic liver or kidney disease or have ever had a stomach ulcer or gastrointestinal bleeding, talk with your healthcare provider before using these medicines. Also talk to your provider if you are taking medicine to prevent blood clots.) Aspirin should never be given to anyone younger than 18 years of age who is ill with a viral infection or fever. It may cause severe liver or brain damage.  · Your appetite may be poor, so a light diet is fine. Avoid dehydration by drinking 6 to 8 glasses of fluids per day (such as water, soft drinks, sports drinks, juices, tea, or soup). Extra fluids will help loosen secretions in the nose and lungs.  · Over-the-counter cough, cold, and sore-throat medicines will not shorten the length of the illness, but they may be helpful to reduce symptoms. (Note: Do not use decongestants if you have high blood pressure.)  · Finish all antibiotic medicine. Do this even if you are feeling  better after only a few days.  Follow-up care  Follow up with your healthcare provider, or as advised. If you had an X-ray or ECG (electrocardiogram), a specialist will review it. You will be notified of any new findings that may affect your care.  Note: If you are age 65 or older, or if you have a chronic lung disease or condition that affects your immune system, or you smoke, talk to your healthcare provider about having pneumococcal vaccinations and a yearly influenza vaccination (flu shot).  When to seek medical advice  Call your healthcare provider right away if any of these occur:  · Fever of 100.4°F (38°C) or higher  · Coughing up increased amounts of colored sputum  · Weakness, drowsiness, headache, facial pain, ear pain, or a stiff neck  Call 911, or get immediate medical care  Contact emergency services right away if any of these occur.  · Coughing up blood  · Worsening weakness, drowsiness, headache, or stiff neck  · Trouble breathing, wheezing, or pain with breathing  Date Last Reviewed: 9/13/2015 © 2000-2017 [employer]. [address] [place] [address], [place] [address]. All rights reserved. This information is not intended as a substitute for professional medical care. Always follow your healthcare professional's instructions.

## 2019-04-18 NOTE — PROGRESS NOTES
"Subjective:       Patient ID: Arti Cortés is a 63 y.o. female.    Vitals:  height is 5' 4" (1.626 m) and weight is 83.5 kg (184 lb). Her oral temperature is 98.2 °F (36.8 °C). Her blood pressure is 109/65 and her pulse is 74. Her respiration is 18 and oxygen saturation is 96%.     Chief Complaint: Cough and Nasal Congestion    Pt presents today with cough X's 2 weeks, pt tested for flu last week negative. Seen one week ago for for similar symptoms - more sinus pressure/inflammation. Treated with steroids. CURRENT SMOKER. REPORTS SICK CONTACTS-OTHERS WITH URI, PNEUMONIA AT HOME.     Cough   This is a recurrent problem. The current episode started 1 to 4 weeks ago. The problem has been unchanged. The problem occurs constantly. The cough is non-productive. Pertinent negatives include no chills, ear pain, eye redness, fever, hemoptysis, myalgias, rash, sore throat, shortness of breath or wheezing. Nothing aggravates the symptoms. She has tried oral steroids and OTC cough suppressant for the symptoms. The treatment provided no relief.       Constitution: Negative for chills, sweating, fatigue and fever.   HENT: Positive for congestion. Negative for ear pain, sinus pain, sinus pressure, sore throat and voice change.    Neck: Negative for painful lymph nodes.   Eyes: Negative for eye redness.   Respiratory: Positive for cough. Negative for chest tightness, sputum production, bloody sputum, COPD, shortness of breath, stridor, wheezing and asthma.    Gastrointestinal: Negative for nausea and vomiting.   Musculoskeletal: Negative for muscle ache.   Skin: Negative for rash.   Allergic/Immunologic: Negative for seasonal allergies and asthma.   Hematologic/Lymphatic: Negative for swollen lymph nodes.       Objective:      Physical Exam   Constitutional: She is oriented to person, place, and time. She appears well-developed and well-nourished. She is cooperative.  Non-toxic appearance. She does not appear ill. No distress. "   HENT:   Head: Normocephalic and atraumatic.   Right Ear: Hearing, tympanic membrane, external ear and ear canal normal.   Left Ear: Hearing, tympanic membrane, external ear and ear canal normal.   Nose: Nose normal. No mucosal edema, rhinorrhea or nasal deformity. No epistaxis. Right sinus exhibits no maxillary sinus tenderness and no frontal sinus tenderness. Left sinus exhibits no maxillary sinus tenderness and no frontal sinus tenderness.   Mouth/Throat: Uvula is midline, oropharynx is clear and moist and mucous membranes are normal. No trismus in the jaw. Normal dentition. No uvula swelling. No posterior oropharyngeal erythema.   Eyes: Conjunctivae and lids are normal. No scleral icterus.   Sclera clear bilat   Neck: Trachea normal, full passive range of motion without pain and phonation normal. Neck supple.   Cardiovascular: Normal rate, regular rhythm, normal heart sounds, intact distal pulses and normal pulses.   Pulmonary/Chest: Effort normal. No respiratory distress. She has decreased breath sounds in the right upper field and the left upper field. She has wheezes in the right upper field. She has rhonchi.   Abdominal: Soft. Normal appearance and bowel sounds are normal. She exhibits no distension. There is no tenderness.   Musculoskeletal: Normal range of motion. She exhibits no edema or deformity.   Neurological: She is alert and oriented to person, place, and time. She exhibits normal muscle tone. Coordination normal.   Skin: Skin is warm, dry and intact. She is not diaphoretic. No pallor.   Psychiatric: She has a normal mood and affect. Her speech is normal and behavior is normal. Judgment and thought content normal. Cognition and memory are normal.   Nursing note and vitals reviewed.      Assessment:       1. Acute bronchitis with bronchospasm    2. Smoker        Plan:         Acute bronchitis with bronchospasm  -     albuterol nebulizer solution 2.5 mg  -     ipratropium 0.02 % nebulizer solution  0.5 mg  -     predniSONE tablet 40 mg  -     benzonatate (TESSALON) 200 MG capsule; Take 1 capsule (200 mg total) by mouth 3 (three) times daily as needed.  Dispense: 30 capsule; Refill: 0  -     doxycycline (VIBRAMYCIN) 100 MG Cap; Take 1 capsule (100 mg total) by mouth 2 (two) times daily. for 10 days  Dispense: 20 capsule; Refill: 0  -     predniSONE (DELTASONE) 20 MG tablet; Take 2 tablets (40 mg total) by mouth once daily. for 3 days  Dispense: 6 tablet; Refill: 0    Smoker  -     doxycycline (VIBRAMYCIN) 100 MG Cap; Take 1 capsule (100 mg total) by mouth 2 (two) times daily. for 10 days  Dispense: 20 capsule; Refill: 0      Patient Instructions   Follow up with your doctor in a few days.  Return to the urgent care or go to the ER if symptoms get worse.    Follow up with your doctor in a few days.  Return to the urgent care or go to the ER if symptoms get worse.    START ORAL STEROID TOMORROW.  START ANTIBIOTICS TODAY (DOXYCYCLINE) AS DIRECTED.    COUGH: MUCINEX DM AS DIRECTED.  TESSALON DURING THE DAY FOR COUGH CONTROL       WARM SALT WATER GARGLES FOR THROAT DISCOMFORT.    TYLENOL EVERY 4 HOURS OR/AND MOTRIN 600MG EVERY 6 HOURS FOR THROAT PAIN/FEVER.    SINUS CONGESTION:    TAKE DAILY ANTIHISTAMINE WITH DECONGESTANT (CLARATIN D, ALLEGRA D, ZYRTEC D) FOR THE NEXT 7-10 DAYS    TAKE DAILY FLONASE AS DIRECTED FOR THE NEXT 7-10 DAYS FOR CONGESTION.    SEE HANDOUT ON SINUSITUS AND BRONCHITIS      IF SYMPTOMS FAIL TO IMPROVE IN THE NEXT 5-7 DAYS, OR IMPROVE AND THEN WORSEN, FOLLOW UP WITH A HEALTHCARE PROVIDER.            Sinusitis (No Antibiotics)    The sinuses are air-filled spaces within the bones of the face. They connect to the inside of the nose. Sinusitis is an inflammation of the tissue lining the sinus cavity. Sinus inflammation can occur during a cold. It can also be due to allergies to pollens and other particles in the air. It can cause symptoms such as sinus congestion, headache, sore throat, facial  swelling and fullness. It may also cause a low-grade fever. No infection is present, and no antibiotic treatment is needed.  Home care  · Drink plenty of water, hot tea, and other liquids. This may help thin mucus. It also may promote sinus drainage.  · Heat may help soothe painful areas of the face. Use a towel soaked in hot water. Or,  the shower and direct the hot spray onto your face. Using a vaporizer along with a menthol rub at night may also help.   · An expectorant containing guaifenesin may help thin the mucus and promote drainage from the sinuses.  · Over-the-counter decongestants may be used unless a similar medicine was prescribed. Nasal sprays work the fastest. Use one that contains phenylephrine or oxymetazoline. First blow the nose gently. Then use the spray. Do not use these medicines more often than directed on the label or symptoms may get worse. You may also use tablets containing pseudoephedrine. Avoid products that combine ingredients, because side effects may be increased. Read labels. You can also ask the pharmacist for help. (NOTE: Persons with high blood pressure should not use decongestants. They can raise blood pressure.)  · Over-the-counter antihistamines may help if allergies contributed to your sinusitis.    · Use acetaminophen or ibuprofen to control pain, unless another pain medicine was prescribed. (If you have chronic liver or kidney disease or ever had a stomach ulcer, talk with your doctor before using these medicines. Aspirin should never be used in anyone under 18 years of age who is ill with a fever. It may cause severe liver damage.)  · Use nasal rinses or irrigation as instructed by your health care provider.  · Don't smoke. This can worsen symptoms.  Follow-up care  Follow up with your healthcare provider or our staff if you are not improving within the next week.  When to seek medical advice  Call your healthcare provider if any of these occur:  · Green or yellow  discharge from the nose or into the throat  · Facial pain or headache becoming more severe  · Stiff neck  · Unusual drowsiness or confusion  · Swelling of the forehead or eyelids  · Vision problems, including blurred or double vision  · Fever of 100.4ºF (38ºC) or higher, or as directed by your healthcare provider  · Seizure  · Breathing problems  · Symptoms not resolving within 10 days  Date Last Reviewed: 4/13/2015 © 2000-2017 [employer]. [address] [place] [address], [place] [address]. All rights reserved. This information is not intended as a substitute for professional medical care. Always follow your healthcare professional's instructions.      Bronchitis, Antibiotic Treatment (Adult)    Bronchitis is an infection of the air passages (bronchial tubes) in your lungs. It often occurs when you have a cold. This illness is contagious during the first few days and is spread through the air by coughing and sneezing, or by direct contact (touching the sick person and then touching your own eyes, nose, or mouth).  Symptoms of bronchitis include cough with mucus (phlegm) and low-grade fever. Bronchitis usually lasts 7 to 14 days. Mild cases can be treated with simple home remedies. More severe infection is treated with an antibiotic.  Home care  Follow these guidelines when caring for yourself at home:  · If your symptoms are severe, rest at home for the first 2 to 3 days. When you go back to your usual activities, don't let yourself get too tired.  · Do not smoke. Also avoid being exposed to secondhand smoke.  · You may use over-the-counter medicines to control fever or pain, unless another medicine was prescribed. (Note: If you have chronic liver or kidney disease or have ever had a stomach ulcer or gastrointestinal bleeding, talk with your healthcare provider before using these medicines. Also talk to your provider if you are taking medicine to prevent blood clots.) Aspirin should never be given to anyone  younger than 18 years of age who is ill with a viral infection or fever. It may cause severe liver or brain damage.  · Your appetite may be poor, so a light diet is fine. Avoid dehydration by drinking 6 to 8 glasses of fluids per day (such as water, soft drinks, sports drinks, juices, tea, or soup). Extra fluids will help loosen secretions in the nose and lungs.  · Over-the-counter cough, cold, and sore-throat medicines will not shorten the length of the illness, but they may be helpful to reduce symptoms. (Note: Do not use decongestants if you have high blood pressure.)  · Finish all antibiotic medicine. Do this even if you are feeling better after only a few days.  Follow-up care  Follow up with your healthcare provider, or as advised. If you had an X-ray or ECG (electrocardiogram), a specialist will review it. You will be notified of any new findings that may affect your care.  Note: If you are age 65 or older, or if you have a chronic lung disease or condition that affects your immune system, or you smoke, talk to your healthcare provider about having pneumococcal vaccinations and a yearly influenza vaccination (flu shot).  When to seek medical advice  Call your healthcare provider right away if any of these occur:  · Fever of 100.4°F (38°C) or higher  · Coughing up increased amounts of colored sputum  · Weakness, drowsiness, headache, facial pain, ear pain, or a stiff neck  Call 911, or get immediate medical care  Contact emergency services right away if any of these occur.  · Coughing up blood  · Worsening weakness, drowsiness, headache, or stiff neck  · Trouble breathing, wheezing, or pain with breathing  Date Last Reviewed: 9/13/2015  © 3646-3659 [employer]. [address] [place] [address], [place] [address]. All rights reserved. This information is not intended as a substitute for professional medical care. Always follow your healthcare professional's instructions.

## 2019-04-21 ENCOUNTER — TELEPHONE (OUTPATIENT)
Dept: URGENT CARE | Facility: CLINIC | Age: 64
End: 2019-04-21

## 2019-09-16 ENCOUNTER — OFFICE VISIT (OUTPATIENT)
Dept: URGENT CARE | Facility: CLINIC | Age: 64
End: 2019-09-16
Payer: COMMERCIAL

## 2019-09-16 VITALS
OXYGEN SATURATION: 97 % | WEIGHT: 184 LBS | HEART RATE: 83 BPM | BODY MASS INDEX: 31.41 KG/M2 | TEMPERATURE: 98 F | HEIGHT: 64 IN | RESPIRATION RATE: 18 BRPM

## 2019-09-16 DIAGNOSIS — Z76.89 ENCOUNTER TO ESTABLISH CARE: ICD-10-CM

## 2019-09-16 DIAGNOSIS — R11.2 NAUSEA AND VOMITING, INTRACTABILITY OF VOMITING NOT SPECIFIED, UNSPECIFIED VOMITING TYPE: ICD-10-CM

## 2019-09-16 DIAGNOSIS — G43.009 MIGRAINE WITHOUT AURA AND WITHOUT STATUS MIGRAINOSUS, NOT INTRACTABLE: Primary | ICD-10-CM

## 2019-09-16 PROCEDURE — 99214 OFFICE O/P EST MOD 30 MIN: CPT | Mod: 25,S$GLB,, | Performed by: PHYSICIAN ASSISTANT

## 2019-09-16 PROCEDURE — 96372 THER/PROPH/DIAG INJ SC/IM: CPT | Mod: S$GLB,,, | Performed by: PHYSICIAN ASSISTANT

## 2019-09-16 PROCEDURE — 3008F BODY MASS INDEX DOCD: CPT | Mod: CPTII,S$GLB,, | Performed by: PHYSICIAN ASSISTANT

## 2019-09-16 PROCEDURE — 99214 PR OFFICE/OUTPT VISIT, EST, LEVL IV, 30-39 MIN: ICD-10-PCS | Mod: 25,S$GLB,, | Performed by: PHYSICIAN ASSISTANT

## 2019-09-16 PROCEDURE — 96372 PR INJECTION,THERAP/PROPH/DIAG2ST, IM OR SUBCUT: ICD-10-PCS | Mod: S$GLB,,, | Performed by: PHYSICIAN ASSISTANT

## 2019-09-16 PROCEDURE — 3008F PR BODY MASS INDEX (BMI) DOCUMENTED: ICD-10-PCS | Mod: CPTII,S$GLB,, | Performed by: PHYSICIAN ASSISTANT

## 2019-09-16 RX ORDER — KETOROLAC TROMETHAMINE 30 MG/ML
30 INJECTION, SOLUTION INTRAMUSCULAR; INTRAVENOUS
Status: COMPLETED | OUTPATIENT
Start: 2019-09-16 | End: 2019-09-16

## 2019-09-16 RX ORDER — ONDANSETRON 2 MG/ML
4 INJECTION INTRAMUSCULAR; INTRAVENOUS
Status: COMPLETED | OUTPATIENT
Start: 2019-09-16 | End: 2019-09-16

## 2019-09-16 RX ORDER — PREDNISONE 10 MG/1
20 TABLET ORAL DAILY
Qty: 6 TABLET | Refills: 0 | Status: SHIPPED | OUTPATIENT
Start: 2019-09-16 | End: 2019-09-19

## 2019-09-16 RX ORDER — ONDANSETRON 8 MG/1
8 TABLET, ORALLY DISINTEGRATING ORAL EVERY 8 HOURS PRN
Qty: 20 TABLET | Refills: 0 | Status: SHIPPED | OUTPATIENT
Start: 2019-09-16 | End: 2023-01-01 | Stop reason: CLARIF

## 2019-09-16 RX ADMIN — ONDANSETRON 4 MG: 2 INJECTION INTRAMUSCULAR; INTRAVENOUS at 05:09

## 2019-09-16 RX ADMIN — KETOROLAC TROMETHAMINE 30 MG: 30 INJECTION, SOLUTION INTRAMUSCULAR; INTRAVENOUS at 05:09

## 2019-09-16 NOTE — PATIENT INSTRUCTIONS
"If you were prescribed a narctic or controlled medication, do not drive or operate heavy equipment or machinery while taking these medications.  You must understand that you've received an Urgent Care treatment only and that you may be released before all your medical problems are known or treated. You, the patient, will arrange for follow up care as instructed.  Follow up with your PCP or specialty clinic as directed if not improved or as needed. You can call [phone] to schedule an appointment with the appropriate provider.  If your condition worsens we recommend that you receive another evaluation at the Emergency Department for any concerns or worsening of condition.  Patient aware and verbalized understanding.    Encouraged patient to get lots of rest and hydrate with plenty of fluids.  Recommended very bland diet for the next 24-48 hours.   Avoid spicy foods and fast food/greasy food until symptoms have resolved.  Advised patient to apply ice pack on the forehead, base of the skull and/or over the eye to help with pain relief.  Advised patient to avoid "screen time" for the next 24 hours because rest is most important without light or sound.  When you get home, advised patient to get a dark and quiet room and rest. Bright glaring light and loud noise can worsen headaches.  OTC NSAIDs every 6 hours as needed for headache, fever or pain.  Keep headache diary as discussed to identify possible triggers.  Zofran RX as prescribed for nausea/vomiting as needed.  Prednisone RX as prescribed to help reduce inflammation/swelling.  Advised patient to follow-up appointment with PCP in the next 24 hours for further evaluation as needed.  Advised patient to go to the nearest ER for further evaluation if any of the following occurs:   - Worsening of your head pain or no improvement within 12 hours  - Repeated vomiting (unable to keep liquids down)  - Fever over 100.0ºF (37.8ºC)  - Stiff neck  - Extreme drowsiness, " confusion or fainting  - Dizziness, vertigo (dizziness with spinning sensation)  - Weakness of an arm or leg or one side of the face  - Difficulty with speech or vision  Patient aware, verbalized understanding and agreed with patient's plan of care.    Preventing Migraine Headaches: Medicines and Lifestyle Changes     Going to bed and getting up at the same time each day, including weekends, may help prevent migraines.     A migraine is a type of severe headache. Having a migraine can be very painful. But there are steps you can take to help prevent migraines.  Medicines to help prevent migraines  · Your healthcare provider may prescribe certain medicines to help prevent migraines. These medicines may need to be taken daily. Or they may only need to be taken at times when youre likely to have a migraine.  · Common medicines used to help prevent migraines include:  ¨ Triptans (serotonin receptor agonists)  ¨ Nonsteroidal anti-inflammatory drugs (available over-the-counter)  ¨ Beta-blockers  ¨ Anticonvulsants  ¨ Tricyclic antidepressants  ¨ Calcium channel blockers  ¨ Certain vitamins, minerals, and plant extracts  ¨ Botulinum toxin injection (Botox) for certain chronic migraines   ¨ CGRP (calcitonin gene-related peptide) agnonists are being reviewed by the Food and Drug Administration (FDA)  Lifestyle changes for long-term prevention  Here are some suggestions:  · Exercise. Regular exercise can help prevent migraines and improve your health. (If exercise triggers your migraines, talk to your healthcare provider.)  · Keep regular habits. Dont skip or delay meals. Drink plenty of water. And go to bed and get up at about the same time each day. This includes weekends.  · Try alternative treatments. These are treatments that do not involve the use of medicines or surgery. They may help relieve symptoms and prevent migraines. Some treatment options include biofeedback and acupuncture. Ask your healthcare provider to  tell you more about these treatments if you have questions.  · Limit caffeine. You may find that caffeine helps relieve pain during an attack. But too much caffeine can also trigger migraines. So, limit the amount of caffeine you consume.  Date Last Reviewed: 10/11/2015  © 9367-0664 [employer]. [address] [place] [address], [place] [address]. All rights reserved. This information is not intended as a substitute for professional medical care. Always follow your healthcare professional's instructions.

## 2019-09-16 NOTE — LETTER
September 16, 2019      [facility] [address], [address]  [facility] [address]  Phone: [phone]  Fax: [phone]       Patient: Arti Cortés   YOB: 1955  Date of Visit: 09/16/2019    To Whom It May Concern:    Florian Cortés  was at [facility] on 09/16/2019. She may return to work/school on 09/17/2019 with no restrictions. If you have any questions or concerns, or if I can be of further assistance, please do not hesitate to contact me.    Sincerely,        Maggie Wolfe PA-C

## 2019-09-16 NOTE — PROGRESS NOTES
"Subjective:       Patient ID: Arti Cortés is a 63 y.o. female.    Vitals:  height is 5' 4" (1.626 m) and weight is 83.5 kg (184 lb). Her oral temperature is 97.7 °F (36.5 °C). Her pulse is 83. Her respiration is 18 and oxygen saturation is 97%.     Chief Complaint: Headache    Headache    This is a recurrent problem. The current episode started in the past 7 days. The problem occurs constantly. The problem has been unchanged. The pain is located in the occipital and temporal region. The pain does not radiate. The pain quality is similar to prior headaches. The quality of the pain is described as aching, squeezing and throbbing. The pain is at a severity of 8/10. The pain is moderate. Associated symptoms include nausea and vomiting. Pertinent negatives include no abdominal pain, abnormal behavior, anorexia, back pain, blurred vision, coughing, dizziness, drainage, ear pain, eye pain, eye redness, eye watering, facial sweating, fever, hearing loss, insomnia, loss of balance, muscle aches, neck pain, numbness, phonophobia, photophobia, rhinorrhea, scalp tenderness, seizures, sinus pressure, sore throat, swollen glands, tingling, tinnitus, visual change, weakness or weight loss. The symptoms are aggravated by bright light and noise. She has tried darkened room and Excedrin for the symptoms. The treatment provided mild relief. Her past medical history is significant for migraine headaches and migraines in the family. There is no history of cancer, cluster headaches, hypertension, immunosuppression, obesity, pseudotumor cerebri, recent head traumas, sinus disease or TMJ.       Constitution: Positive for fatigue. Negative for chills, sweating and fever.   HENT: Negative for ear pain, tinnitus, hearing loss, drooling, facial swelling, facial trauma, congestion, sinus pain, sinus pressure, sore throat, trouble swallowing and voice change.    Neck: Negative for neck pain, neck stiffness, painful lymph nodes and neck " swelling.   Cardiovascular: Negative for chest pain, leg swelling, palpitations, sob on exertion and passing out.   Eyes: Negative for eye pain, eye redness, photophobia, vision loss, double vision, blurred vision and eyelid swelling.   Respiratory: Negative for chest tightness, cough, sputum production, bloody sputum, shortness of breath, stridor and wheezing.    Gastrointestinal: Positive for nausea and vomiting. Negative for abdominal pain, abdominal bloating, constipation, diarrhea and heartburn.   Genitourinary: Negative for dysuria, flank pain, hematuria and pelvic pain.   Musculoskeletal: Negative for trauma, joint pain, joint swelling, abnormal ROM of joint, back pain, muscle cramps and muscle ache.   Skin: Negative for rash, wound, lesion and hives.   Allergic/Immunologic: Negative for hives, itching and sneezing.   Neurological: Positive for headaches and history of migraines. Negative for dizziness, history of vertigo, light-headedness, passing out, facial drooping, speech difficulty, coordination disturbances, loss of balance, disorientation, altered mental status, loss of consciousness, numbness, tingling, seizures and tremors.   Hematologic/Lymphatic: Negative for swollen lymph nodes.   Psychiatric/Behavioral: Negative for altered mental status, disorientation, confusion, nervous/anxious, sleep disturbance and depression. The patient is not nervous/anxious and does not have insomnia.        Objective:      Physical Exam   Constitutional: She is oriented to person, place, and time. She appears well-developed and well-nourished.  Non-toxic appearance. She does not appear ill. No distress.   Patient is stable, seated comfortably in NAD.   HENT:   Head: Normocephalic and atraumatic.   Right Ear: External ear normal.   Left Ear: External ear normal.   Nose: Nose normal.   Mouth/Throat: Uvula is midline, oropharynx is clear and moist and mucous membranes are normal. No posterior oropharyngeal erythema.    Eyes: Pupils are equal, round, and reactive to light. Conjunctivae, EOM and lids are normal.   Neck: Trachea normal, normal range of motion and full passive range of motion without pain. Neck supple.   Cardiovascular: Normal rate, regular rhythm and normal heart sounds.   Pulmonary/Chest: Effort normal and breath sounds normal. No respiratory distress.   Abdominal: Soft. Normal appearance and bowel sounds are normal. She exhibits no distension, no abdominal bruit, no pulsatile midline mass and no mass. There is no tenderness. There is no rigidity, no rebound, no guarding, no CVA tenderness, no tenderness at McBurney's point and negative Lew's sign.   Musculoskeletal: Normal range of motion. She exhibits no edema.   Lymphadenopathy:     She has no cervical adenopathy.   Neurological: She is alert and oriented to person, place, and time. She has normal strength and normal reflexes. No cranial nerve deficit or sensory deficit. She displays a negative Romberg sign. Gait normal. GCS eye subscore is 4. GCS verbal subscore is 5. GCS motor subscore is 6.   Skin: Skin is warm, dry and intact. Capillary refill takes less than 2 seconds. No rash noted. She is not diaphoretic. No pallor.   Psychiatric: She has a normal mood and affect. Her speech is normal and behavior is normal. Judgment and thought content normal. Cognition and memory are normal.   Nursing note and vitals reviewed.      Assessment:       1. Migraine without aura and without status migrainosus, not intractable    2. Nausea and vomiting, intractability of vomiting not specified, unspecified vomiting type    3. Encounter to establish care        Plan:         Migraine without aura and without status migrainosus, not intractable  -     ketorolac injection 30 mg  -     ondansetron injection 4 mg  -     ondansetron (ZOFRAN-ODT) 8 MG TbDL; Take 1 tablet (8 mg total) by mouth every 8 (eight) hours as needed (for nausea/vomiting.).  Dispense: 20 tablet; Refill:  "0  -     predniSONE (DELTASONE) 10 MG tablet; Take 2 tablets (20 mg total) by mouth once daily. for 3 days  Dispense: 6 tablet; Refill: 0    Nausea and vomiting, intractability of vomiting not specified, unspecified vomiting type  -     ondansetron injection 4 mg  -     ondansetron (ZOFRAN-ODT) 8 MG TbDL; Take 1 tablet (8 mg total) by mouth every 8 (eight) hours as needed (for nausea/vomiting.).  Dispense: 20 tablet; Refill: 0    Encounter to establish care  -     Ambulatory referral to Internal Medicine      Patient Instructions     If you were prescribed a narctic or controlled medication, do not drive or operate heavy equipment or machinery while taking these medications.  You must understand that you've received an Urgent Care treatment only and that you may be released before all your medical problems are known or treated. You, the patient, will arrange for follow up care as instructed.  Follow up with your PCP or specialty clinic as directed if not improved or as needed. You can call [phone] to schedule an appointment with the appropriate provider.  If your condition worsens we recommend that you receive another evaluation at the Emergency Department for any concerns or worsening of condition.  Patient aware and verbalized understanding.    Encouraged patient to get lots of rest and hydrate with plenty of fluids.  Recommended very bland diet for the next 24-48 hours.   Avoid spicy foods and fast food/greasy food until symptoms have resolved.  Advised patient to apply ice pack on the forehead, base of the skull and/or over the eye to help with pain relief.  Advised patient to avoid "screen time" for the next 24 hours because rest is most important without light or sound.  When you get home, advised patient to get a dark and quiet room and rest. Bright glaring light and loud noise can worsen headaches.  OTC NSAIDs every 6 hours as needed for headache, fever or pain.  Keep headache diary as discussed to " identify possible triggers.  Zofran RX as prescribed for nausea/vomiting as needed.  Prednisone RX as prescribed to help reduce inflammation/swelling.  Advised patient to follow-up appointment with PCP in the next 24 hours for further evaluation as needed.  Advised patient to go to the nearest ER for further evaluation if any of the following occurs:   - Worsening of your head pain or no improvement within 12 hours  - Repeated vomiting (unable to keep liquids down)  - Fever over 100.0ºF (37.8ºC)  - Stiff neck  - Extreme drowsiness, confusion or fainting  - Dizziness, vertigo (dizziness with spinning sensation)  - Weakness of an arm or leg or one side of the face  - Difficulty with speech or vision  Patient aware, verbalized understanding and agreed with patient's plan of care.    Preventing Migraine Headaches: Medicines and Lifestyle Changes     Going to bed and getting up at the same time each day, including weekends, may help prevent migraines.     A migraine is a type of severe headache. Having a migraine can be very painful. But there are steps you can take to help prevent migraines.  Medicines to help prevent migraines  · Your healthcare provider may prescribe certain medicines to help prevent migraines. These medicines may need to be taken daily. Or they may only need to be taken at times when youre likely to have a migraine.  · Common medicines used to help prevent migraines include:  ¨ Triptans (serotonin receptor agonists)  ¨ Nonsteroidal anti-inflammatory drugs (available over-the-counter)  ¨ Beta-blockers  ¨ Anticonvulsants  ¨ Tricyclic antidepressants  ¨ Calcium channel blockers  ¨ Certain vitamins, minerals, and plant extracts  ¨ Botulinum toxin injection (Botox) for certain chronic migraines   ¨ CGRP (calcitonin gene-related peptide) agnonists are being reviewed by the Food and Drug Administration (FDA)  Lifestyle changes for long-term prevention  Here are some suggestions:  · Exercise. Regular  exercise can help prevent migraines and improve your health. (If exercise triggers your migraines, talk to your healthcare provider.)  · Keep regular habits. Dont skip or delay meals. Drink plenty of water. And go to bed and get up at about the same time each day. This includes weekends.  · Try alternative treatments. These are treatments that do not involve the use of medicines or surgery. They may help relieve symptoms and prevent migraines. Some treatment options include biofeedback and acupuncture. Ask your healthcare provider to tell you more about these treatments if you have questions.  · Limit caffeine. You may find that caffeine helps relieve pain during an attack. But too much caffeine can also trigger migraines. So, limit the amount of caffeine you consume.  Date Last Reviewed: 10/11/2015  © 9032-0186 [employer]. [address] [place] [address], [place] [address]. All rights reserved. This information is not intended as a substitute for professional medical care. Always follow your healthcare professional's instructions.

## 2019-09-16 NOTE — LETTER
September 16, 2019      [facility] [address], [address]  [facility] [address]  Phone: [phone]  Fax: [phone]       Patient: Arti Cortés   YOB: 1955  Date of Visit: 09/16/2019    To Whom It May Concern:    Florian Cortés  was at [facility] on 09/16/2019. She may return to work/school on 09/18/2019 with no restrictions. If you have any questions or concerns, or if I can be of further assistance, please do not hesitate to contact me.    Sincerely,        Maggie Wolfe PA-C

## 2021-02-23 ENCOUNTER — OFFICE VISIT (OUTPATIENT)
Dept: URGENT CARE | Facility: CLINIC | Age: 66
End: 2021-02-23
Payer: COMMERCIAL

## 2021-02-23 VITALS
SYSTOLIC BLOOD PRESSURE: 163 MMHG | WEIGHT: 184 LBS | RESPIRATION RATE: 16 BRPM | BODY MASS INDEX: 31.41 KG/M2 | TEMPERATURE: 98 F | HEIGHT: 64 IN | HEART RATE: 92 BPM | OXYGEN SATURATION: 95 % | DIASTOLIC BLOOD PRESSURE: 100 MMHG

## 2021-02-23 DIAGNOSIS — J34.89 SINUS PAIN: ICD-10-CM

## 2021-02-23 DIAGNOSIS — R51.9 NONINTRACTABLE HEADACHE, UNSPECIFIED CHRONICITY PATTERN, UNSPECIFIED HEADACHE TYPE: ICD-10-CM

## 2021-02-23 DIAGNOSIS — B96.89 BACTERIAL SINUSITIS: Primary | ICD-10-CM

## 2021-02-23 DIAGNOSIS — J32.9 BACTERIAL SINUSITIS: Primary | ICD-10-CM

## 2021-02-23 LAB
CTP QC/QA: YES
SARS-COV-2 RDRP RESP QL NAA+PROBE: NEGATIVE

## 2021-02-23 PROCEDURE — 3008F PR BODY MASS INDEX (BMI) DOCUMENTED: ICD-10-PCS | Mod: CPTII,S$GLB,, | Performed by: NURSE PRACTITIONER

## 2021-02-23 PROCEDURE — 99214 PR OFFICE/OUTPT VISIT, EST, LEVL IV, 30-39 MIN: ICD-10-PCS | Mod: S$GLB,,, | Performed by: NURSE PRACTITIONER

## 2021-02-23 PROCEDURE — U0002: ICD-10-PCS | Mod: QW,S$GLB,, | Performed by: NURSE PRACTITIONER

## 2021-02-23 PROCEDURE — 99214 OFFICE O/P EST MOD 30 MIN: CPT | Mod: S$GLB,,, | Performed by: NURSE PRACTITIONER

## 2021-02-23 PROCEDURE — U0002 COVID-19 LAB TEST NON-CDC: HCPCS | Mod: QW,S$GLB,, | Performed by: NURSE PRACTITIONER

## 2021-02-23 PROCEDURE — 3008F BODY MASS INDEX DOCD: CPT | Mod: CPTII,S$GLB,, | Performed by: NURSE PRACTITIONER

## 2021-02-23 RX ORDER — AZELASTINE 1 MG/ML
1 SPRAY, METERED NASAL 2 TIMES DAILY
Qty: 30 ML | Refills: 0 | Status: SHIPPED | OUTPATIENT
Start: 2021-02-23 | End: 2023-01-01 | Stop reason: CLARIF

## 2021-02-23 RX ORDER — AMOXICILLIN AND CLAVULANATE POTASSIUM 875; 125 MG/1; MG/1
1 TABLET, FILM COATED ORAL 2 TIMES DAILY
Qty: 20 TABLET | Refills: 0 | Status: SHIPPED | OUTPATIENT
Start: 2021-02-23 | End: 2021-03-05

## 2021-02-23 RX ORDER — PREDNISONE 20 MG/1
20 TABLET ORAL 2 TIMES DAILY
Qty: 6 TABLET | Refills: 0 | Status: SHIPPED | OUTPATIENT
Start: 2021-02-23 | End: 2021-02-26

## 2021-02-23 RX ORDER — GABAPENTIN 600 MG/1
600 TABLET ORAL 3 TIMES DAILY
Status: ON HOLD | COMMUNITY
End: 2023-01-01 | Stop reason: HOSPADM

## 2021-04-14 ENCOUNTER — OFFICE VISIT (OUTPATIENT)
Dept: URGENT CARE | Facility: CLINIC | Age: 66
End: 2021-04-14
Payer: COMMERCIAL

## 2021-04-14 VITALS
HEIGHT: 64 IN | SYSTOLIC BLOOD PRESSURE: 143 MMHG | WEIGHT: 200 LBS | DIASTOLIC BLOOD PRESSURE: 83 MMHG | BODY MASS INDEX: 34.15 KG/M2 | RESPIRATION RATE: 16 BRPM | OXYGEN SATURATION: 96 % | TEMPERATURE: 98 F | HEART RATE: 83 BPM
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
## 2021-04-14 DIAGNOSIS — S49.92XA ARM INJURY, LEFT, INITIAL ENCOUNTER: Primary | ICD-10-CM

## 2021-04-14 PROCEDURE — 73080 X-RAY EXAM OF ELBOW: CPT | Mod: LT,S$GLB,, | Performed by: RADIOLOGY

## 2021-04-14 PROCEDURE — 73080 XR ELBOW COMPLETE 3 VIEW LEFT: ICD-10-PCS | Mod: LT,S$GLB,, | Performed by: RADIOLOGY

## 2021-04-14 PROCEDURE — 99212 OFFICE O/P EST SF 10 MIN: CPT | Mod: S$GLB,,, | Performed by: FAMILY MEDICINE

## 2021-04-14 PROCEDURE — 99212 PR OFFICE/OUTPT VISIT, EST, LEVL II, 10-19 MIN: ICD-10-PCS | Mod: S$GLB,,, | Performed by: FAMILY MEDICINE

## 2021-04-19 ENCOUNTER — OFFICE VISIT (OUTPATIENT)
Dept: URGENT CARE | Facility: CLINIC | Age: 66
End: 2021-04-19
Payer: COMMERCIAL

## 2021-04-19 VITALS
RESPIRATION RATE: 16 BRPM | HEIGHT: 64 IN | TEMPERATURE: 98 F | WEIGHT: 200 LBS | OXYGEN SATURATION: 96 % | SYSTOLIC BLOOD PRESSURE: 180 MMHG | BODY MASS INDEX: 34.15 KG/M2 | HEART RATE: 86 BPM | DIASTOLIC BLOOD PRESSURE: 100 MMHG

## 2021-04-19 DIAGNOSIS — S49.92XD INJURY OF LEFT UPPER EXTREMITY, SUBSEQUENT ENCOUNTER: ICD-10-CM
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
## 2021-04-19 PROCEDURE — 99214 OFFICE O/P EST MOD 30 MIN: CPT | Mod: S$GLB,,, | Performed by: FAMILY MEDICINE

## 2021-04-19 PROCEDURE — 99214 PR OFFICE/OUTPT VISIT, EST, LEVL IV, 30-39 MIN: ICD-10-PCS | Mod: S$GLB,,, | Performed by: FAMILY MEDICINE

## 2021-04-26 ENCOUNTER — OFFICE VISIT (OUTPATIENT)
Dept: URGENT CARE | Facility: CLINIC | Age: 66
End: 2021-04-26
Payer: COMMERCIAL

## 2021-04-26 VITALS
HEIGHT: 64 IN | OXYGEN SATURATION: 94 % | BODY MASS INDEX: 34.15 KG/M2 | SYSTOLIC BLOOD PRESSURE: 138 MMHG | TEMPERATURE: 98 F | HEART RATE: 75 BPM | RESPIRATION RATE: 16 BRPM | WEIGHT: 200 LBS | DIASTOLIC BLOOD PRESSURE: 85 MMHG

## 2021-04-26 DIAGNOSIS — S49.92XD INJURY OF LEFT UPPER EXTREMITY, SUBSEQUENT ENCOUNTER: ICD-10-CM
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
## 2021-04-26 PROCEDURE — 99214 PR OFFICE/OUTPT VISIT, EST, LEVL IV, 30-39 MIN: ICD-10-PCS | Mod: S$GLB,,, | Performed by: FAMILY MEDICINE

## 2021-04-26 PROCEDURE — 99214 OFFICE O/P EST MOD 30 MIN: CPT | Mod: S$GLB,,, | Performed by: FAMILY MEDICINE

## 2021-04-26 RX ORDER — DULOXETIN HYDROCHLORIDE 20 MG/1
20 CAPSULE, DELAYED RELEASE ORAL DAILY
COMMUNITY
End: 2023-01-01 | Stop reason: CLARIF

## 2021-04-27 ENCOUNTER — TELEPHONE (OUTPATIENT)
Dept: NEUROLOGY | Facility: HOSPITAL | Age: 66
End: 2021-04-27

## 2021-05-06 ENCOUNTER — TELEPHONE (OUTPATIENT)
Dept: NEUROLOGY | Facility: HOSPITAL | Age: 66
End: 2021-05-06

## 2023-01-01 ENCOUNTER — OFFICE VISIT (OUTPATIENT)
Dept: NEUROSURGERY | Facility: CLINIC | Age: 68
End: 2023-01-01
Payer: MEDICARE

## 2023-01-01 ENCOUNTER — HOSPITAL ENCOUNTER (OUTPATIENT)
Dept: RADIOLOGY | Facility: HOSPITAL | Age: 68
Discharge: HOME | End: 2023-09-20
Attending: NEUROLOGICAL SURGERY
Payer: COMMERCIAL

## 2023-01-01 ENCOUNTER — TELEPHONE (OUTPATIENT)
Dept: NEUROSURGERY | Facility: CLINIC | Age: 68
End: 2023-01-01
Payer: MEDICARE

## 2023-01-01 ENCOUNTER — TELEPHONE (OUTPATIENT)
Dept: HEMATOLOGY/ONCOLOGY | Facility: CLINIC | Age: 68
End: 2023-01-01
Payer: MEDICARE

## 2023-01-01 ENCOUNTER — OFFICE VISIT (OUTPATIENT)
Dept: URGENT CARE | Facility: CLINIC | Age: 68
End: 2023-01-01
Payer: MEDICARE

## 2023-01-01 VITALS
BODY MASS INDEX: 34.15 KG/M2 | RESPIRATION RATE: 16 BRPM | DIASTOLIC BLOOD PRESSURE: 85 MMHG | HEIGHT: 64 IN | WEIGHT: 200 LBS | TEMPERATURE: 99 F | HEART RATE: 65 BPM | SYSTOLIC BLOOD PRESSURE: 167 MMHG | OXYGEN SATURATION: 97 %

## 2023-01-01 VITALS
SYSTOLIC BLOOD PRESSURE: 111 MMHG | RESPIRATION RATE: 18 BRPM | WEIGHT: 171.94 LBS | BODY MASS INDEX: 27.63 KG/M2 | DIASTOLIC BLOOD PRESSURE: 73 MMHG | HEIGHT: 66 IN | HEART RATE: 64 BPM

## 2023-01-01 DIAGNOSIS — Z98.890 S/P CRANIOTOMY: ICD-10-CM

## 2023-01-01 DIAGNOSIS — Z98.890 S/P CRANIOTOMY: Primary | ICD-10-CM

## 2023-01-01 DIAGNOSIS — J06.9 VIRAL URI: Primary | ICD-10-CM

## 2023-01-01 DIAGNOSIS — J02.9 SORE THROAT: ICD-10-CM

## 2023-01-01 DIAGNOSIS — C71.9 GLIOBLASTOMA: ICD-10-CM

## 2023-01-01 LAB
CTP QC/QA: YES
SARS-COV-2 AG RESP QL IA.RAPID: NEGATIVE

## 2023-01-01 PROCEDURE — 70450 CT HEAD WITHOUT CONTRAST: ICD-10-PCS | Mod: 26,,, | Performed by: RADIOLOGY

## 2023-01-01 PROCEDURE — 3078F DIAST BP <80 MM HG: CPT | Mod: CPTII,S$GLB,, | Performed by: NEUROLOGICAL SURGERY

## 2023-01-01 PROCEDURE — 3078F PR MOST RECENT DIASTOLIC BLOOD PRESSURE < 80 MM HG: ICD-10-PCS | Mod: CPTII,S$GLB,, | Performed by: NEUROLOGICAL SURGERY

## 2023-01-01 PROCEDURE — 99213 PR OFFICE/OUTPT VISIT, EST, LEVL III, 20-29 MIN: ICD-10-PCS | Mod: S$GLB,,, | Performed by: NURSE PRACTITIONER

## 2023-01-01 PROCEDURE — 1125F PR PAIN SEVERITY QUANTIFIED, PAIN PRESENT: ICD-10-PCS | Mod: CPTII,S$GLB,, | Performed by: NEUROLOGICAL SURGERY

## 2023-01-01 PROCEDURE — 99213 OFFICE O/P EST LOW 20 MIN: CPT | Mod: S$GLB,,, | Performed by: NURSE PRACTITIONER

## 2023-01-01 PROCEDURE — 3288F PR FALLS RISK ASSESSMENT DOCUMENTED: ICD-10-PCS | Mod: CPTII,S$GLB,, | Performed by: NEUROLOGICAL SURGERY

## 2023-01-01 PROCEDURE — 4010F PR ACE/ARB THEARPY RXD/TAKEN: ICD-10-PCS | Mod: CPTII,S$GLB,, | Performed by: NEUROLOGICAL SURGERY

## 2023-01-01 PROCEDURE — 70450 CT HEAD/BRAIN W/O DYE: CPT | Mod: TC,PO

## 2023-01-01 PROCEDURE — 70450 CT HEAD/BRAIN W/O DYE: CPT | Mod: 26,,, | Performed by: RADIOLOGY

## 2023-01-01 PROCEDURE — 87811 SARS CORONAVIRUS 2 ANTIGEN POCT, MANUAL READ: ICD-10-PCS | Mod: QW,S$GLB,, | Performed by: NURSE PRACTITIONER

## 2023-01-01 PROCEDURE — 87811 SARS-COV-2 COVID19 W/OPTIC: CPT | Mod: QW,S$GLB,, | Performed by: NURSE PRACTITIONER

## 2023-01-01 PROCEDURE — 3044F PR MOST RECENT HEMOGLOBIN A1C LEVEL <7.0%: ICD-10-PCS | Mod: CPTII,S$GLB,, | Performed by: NEUROLOGICAL SURGERY

## 2023-01-01 PROCEDURE — 4010F ACE/ARB THERAPY RXD/TAKEN: CPT | Mod: CPTII,S$GLB,, | Performed by: NEUROLOGICAL SURGERY

## 2023-01-01 PROCEDURE — 3044F HG A1C LEVEL LT 7.0%: CPT | Mod: CPTII,S$GLB,, | Performed by: NEUROLOGICAL SURGERY

## 2023-01-01 PROCEDURE — 3074F PR MOST RECENT SYSTOLIC BLOOD PRESSURE < 130 MM HG: ICD-10-PCS | Mod: CPTII,S$GLB,, | Performed by: NEUROLOGICAL SURGERY

## 2023-01-01 PROCEDURE — 1101F PT FALLS ASSESS-DOCD LE1/YR: CPT | Mod: CPTII,S$GLB,, | Performed by: NEUROLOGICAL SURGERY

## 2023-01-01 PROCEDURE — 3074F SYST BP LT 130 MM HG: CPT | Mod: CPTII,S$GLB,, | Performed by: NEUROLOGICAL SURGERY

## 2023-01-01 PROCEDURE — 1125F AMNT PAIN NOTED PAIN PRSNT: CPT | Mod: CPTII,S$GLB,, | Performed by: NEUROLOGICAL SURGERY

## 2023-01-01 PROCEDURE — 3288F FALL RISK ASSESSMENT DOCD: CPT | Mod: CPTII,S$GLB,, | Performed by: NEUROLOGICAL SURGERY

## 2023-01-01 PROCEDURE — 1101F PR PT FALLS ASSESS DOC 0-1 FALLS W/OUT INJ PAST YR: ICD-10-PCS | Mod: CPTII,S$GLB,, | Performed by: NEUROLOGICAL SURGERY

## 2023-01-01 PROCEDURE — 99024 PR POST-OP FOLLOW-UP VISIT: ICD-10-PCS | Mod: S$GLB,,, | Performed by: NEUROLOGICAL SURGERY

## 2023-01-01 PROCEDURE — 99024 POSTOP FOLLOW-UP VISIT: CPT | Mod: S$GLB,,, | Performed by: NEUROLOGICAL SURGERY

## 2023-01-01 RX ORDER — METOPROLOL SUCCINATE 25 MG/1
TABLET, EXTENDED RELEASE ORAL
COMMUNITY
Start: 2023-01-01 | End: 2023-01-01

## 2023-01-01 RX ORDER — METHADONE HYDROCHLORIDE 10 MG/5ML
SOLUTION ORAL
COMMUNITY
End: 2023-01-01

## 2023-01-01 RX ORDER — ONDANSETRON 4 MG/1
4 TABLET, ORALLY DISINTEGRATING ORAL EVERY 8 HOURS PRN
Status: ON HOLD | COMMUNITY
Start: 2023-01-01 | End: 2023-01-01 | Stop reason: HOSPADM

## 2023-01-01 RX ORDER — IBUPROFEN 400 MG/1
800 TABLET ORAL
COMMUNITY
End: 2023-01-01 | Stop reason: CLARIF

## 2023-01-01 RX ORDER — ONDANSETRON 4 MG/1
TABLET, ORALLY DISINTEGRATING ORAL
COMMUNITY
Start: 2023-01-01 | End: 2023-01-01

## 2023-01-01 RX ORDER — METOPROLOL SUCCINATE 25 MG/1
25 TABLET, EXTENDED RELEASE ORAL
COMMUNITY
End: 2023-01-01 | Stop reason: CLARIF

## 2023-01-01 RX ORDER — ACETAMINOPHEN 325 MG/1
650 TABLET ORAL EVERY 6 HOURS PRN
COMMUNITY
Start: 2023-01-01 | End: 2023-01-01 | Stop reason: CLARIF

## 2023-07-24 NOTE — PROGRESS NOTES
"Subjective:      Patient ID: Arti Cortés is a 67 y.o. female.    Vitals:  height is 5' 4" (1.626 m) and weight is 90.7 kg (200 lb). Her temporal temperature is 98.7 °F (37.1 °C). Her blood pressure is 167/85 (abnormal) and her pulse is 65. Her respiration is 16 and oxygen saturation is 97%.     Chief Complaint: Fever    Pt presents to urgent care with fever and body aches X 2 days.  She also states that her left ear starting to itch last week, it is not itching anymore.   She states that she did check her temperature at home, she does not have a thermometer, but she felt like she had a fever.     Provider note begins below:  Patient denies cp, sob, N/V or abdominal pain. She's had throat pain and chills as well. Denies measured fever. She is awake and alert. NAD. No sick contacts or travel. She lives alone.    Fever   This is a new problem. The current episode started in the past 7 days. The problem occurs constantly. The problem has been unchanged. Her temperature was unmeasured prior to arrival. Associated symptoms include a sore throat. Pertinent negatives include no chest pain, congestion, coughing, diarrhea, ear pain, nausea, rash, urinary pain or vomiting. She has tried nothing for the symptoms. The treatment provided no relief.     Constitution: Positive for chills and fever. Negative for sweating and fatigue.   HENT:  Positive for sore throat. Negative for ear pain, facial swelling and congestion.    Neck: Negative for painful lymph nodes.   Cardiovascular: Negative.  Negative for chest trauma, chest pain and sob on exertion.   Eyes: Negative.  Negative for eye itching and eye pain.   Respiratory: Negative.  Negative for chest tightness, cough and asthma.    Gastrointestinal: Negative.  Negative for nausea, vomiting and diarrhea.   Endocrine: negative. cold intolerance and excessive thirst.   Genitourinary: Negative.  Negative for dysuria, frequency, urgency and hematuria.   Musculoskeletal:  Negative " for pain, trauma and joint pain.   Skin: Negative.  Negative for rash, wound and hives.   Allergic/Immunologic: Negative.  Negative for eczema, asthma, hives and itching.   Neurological: Negative.  Negative for disorientation and altered mental status.   Hematologic/Lymphatic: Negative.  Negative for swollen lymph nodes.   Psychiatric/Behavioral: Negative.  Negative for altered mental status, disorientation and confusion.     Objective:     Physical Exam   Constitutional: She is oriented to person, place, and time. She appears well-developed. She is cooperative.  Non-toxic appearance. She does not appear ill. No distress.   HENT:   Head: Normocephalic and atraumatic.   Ears:   Right Ear: Hearing, tympanic membrane, external ear and ear canal normal. impacted cerumen  Left Ear: Hearing, tympanic membrane, external ear and ear canal normal. impacted cerumen  Nose: Nose normal. No mucosal edema, rhinorrhea, nasal deformity or congestion. No epistaxis. Right sinus exhibits no maxillary sinus tenderness and no frontal sinus tenderness. Left sinus exhibits no maxillary sinus tenderness and no frontal sinus tenderness.   Mouth/Throat: Uvula is midline and mucous membranes are normal. No trismus in the jaw. Normal dentition. No uvula swelling. Posterior oropharyngeal erythema (Mild) present. No oropharyngeal exudate or posterior oropharyngeal edema.   Eyes: Conjunctivae and lids are normal. No scleral icterus.   Neck: Trachea normal and phonation normal. Neck supple. No edema present. No erythema present. No neck rigidity present.   Cardiovascular: Normal rate, regular rhythm, normal heart sounds and normal pulses.   Pulmonary/Chest: Effort normal and breath sounds normal. No stridor. No respiratory distress. She has no decreased breath sounds. She has no wheezes. She has no rhonchi. She has no rales. She exhibits no tenderness.   Abdominal: Normal appearance. There is no abdominal tenderness.   Musculoskeletal: Normal  range of motion.         General: No deformity. Normal range of motion.   Lymphadenopathy:     She has no cervical adenopathy.   Neurological: no focal deficit. She is alert, oriented to person, place, and time and at baseline. She exhibits normal muscle tone. Coordination normal.   Skin: Skin is warm, dry, intact, not diaphoretic and not pale.   Psychiatric: Her speech is normal and behavior is normal. Mood, judgment and thought content normal.   Nursing note and vitals reviewed.  The following results have been reviewed with the patient:  LABS-  Results for orders placed or performed in visit on 07/24/23   SARS Coronavirus 2 Antigen, POCT Manual Read   Result Value Ref Range    SARS Coronavirus 2 Antigen Negative Negative     Acceptable Yes         IMAGING-  No results found.    Assessment:     1. Viral URI    2. Sore throat        Plan:   FOLLOWUP  Follow up if symptoms worsen or fail to improve, for PLEASE CONTACT PCP OR CONTACT THE EMERGENCY ROOM..     PATIENT INSTRUCTIONS  Patient Instructions   INSTRUCTIONS:  - Rest.  - Drink plenty of fluids.  - Take Tylenol and/or Ibuprofen as directed as needed for fever/pain.  Do not take more than the recommended dose.  - follow up with your PCP within the next 1-2 weeks as needed.  - You must understand that you have received an Urgent Care treatment only and that you may be released before all of your medical problems are known or treated.   - You, the patient, will arrange for follow up care as instructed.   - If your condition worsens or fails to improve we recommend that you receive another evaluation at the ER immediately or contact your PCP to discuss your concerns.   - You can call [phone] or [phone] to help schedule an appointment with the appropriate provider.     -If you smoke cigarettes, it would be beneficial for you to stop.    OVER THE COUNTER RECOMMENDATIONS/SUGGESTIONS.     Make sure to stay well hydrated.     Use Nasal  Saline to mechanically move any post nasal drip from your eustachian tube or from the back of your throat.     Use warm salt water gargles to ease your throat pain. Warm salt water gargles as needed for sore throat-  1/2 tsp salt to 1 cup warm water, gargle as desired.     Use an antihistamine such as Claritin, Zyrtec or Allegra to dry you out.      Use pseudoephedrine (behind the counter) to decongest. Pseudoephedrine  30 mg up to 240 mg /day. It can raise your blood pressure and give you palpitations.     Use mucinex (guaifenisin) to break up mucous up to 2400mg/day to loosen any mucous.   The mucinex DM pill has a cough suppressant that can be sedating. It can be used at night to stop the tickle at the back of your throat.  You can use Mucinex D (it has guaifenesin and a high dose of pseudoephedrine) in the mornings to help decongest.        Use Flonase 1-2 sprays/nostril per day. It is a local acting steroid nasal spray, if you develop a bloody nose, stop using the medication immediately.     Sometimes Nyquil at night is beneficial to help you get some rest, however it is sedating and it does have an antihistamine, and tylenol.     Honey is a natural cough suppressant that can be used.     Tylenol up to 4,000 mg a day is safe for short periods and can be used for body aches, pain, and fever. However in high doses and prolonged use it can cause liver irritation.     Ibuprofen is a non-steroidal anti-inflammatory that can be used for body aches, pain, and fever.However it can also cause stomach irritation if over used.         THANK YOU FOR ALLOWING ME TO PARTICIPATE IN YOUR HEALTHCARE,     Alejandro Maria, GASTON     Viral URI    Sore throat  -     SARS Coronavirus 2 Antigen, POCT Manual Read

## 2023-07-24 NOTE — PATIENT INSTRUCTIONS
INSTRUCTIONS:  - Rest.  - Drink plenty of fluids.  - Take Tylenol and/or Ibuprofen as directed as needed for fever/pain.  Do not take more than the recommended dose.  - follow up with your PCP within the next 1-2 weeks as needed.  - You must understand that you have received an Urgent Care treatment only and that you may be released before all of your medical problems are known or treated.   - You, the patient, will arrange for follow up care as instructed.   - If your condition worsens or fails to improve we recommend that you receive another evaluation at the ER immediately or contact your PCP to discuss your concerns.   - You can call [phone] or [phone] to help schedule an appointment with the appropriate provider.     -If you smoke cigarettes, it would be beneficial for you to stop.    OVER THE COUNTER RECOMMENDATIONS/SUGGESTIONS.     Make sure to stay well hydrated.     Use Nasal Saline to mechanically move any post nasal drip from your eustachian tube or from the back of your throat.     Use warm salt water gargles to ease your throat pain. Warm salt water gargles as needed for sore throat-  1/2 tsp salt to 1 cup warm water, gargle as desired.     Use an antihistamine such as Claritin, Zyrtec or Allegra to dry you out.      Use pseudoephedrine (behind the counter) to decongest. Pseudoephedrine  30 mg up to 240 mg /day. It can raise your blood pressure and give you palpitations.     Use mucinex (guaifenisin) to break up mucous up to 2400mg/day to loosen any mucous.   The mucinex DM pill has a cough suppressant that can be sedating. It can be used at night to stop the tickle at the back of your throat.  You can use Mucinex D (it has guaifenesin and a high dose of pseudoephedrine) in the mornings to help decongest.        Use Flonase 1-2 sprays/nostril per day. It is a local acting steroid nasal spray, if you develop a bloody nose, stop using the medication immediately.     Sometimes Nyquil at night  is beneficial to help you get some rest, however it is sedating and it does have an antihistamine, and tylenol.     Honey is a natural cough suppressant that can be used.     Tylenol up to 4,000 mg a day is safe for short periods and can be used for body aches, pain, and fever. However in high doses and prolonged use it can cause liver irritation.     Ibuprofen is a non-steroidal anti-inflammatory that can be used for body aches, pain, and fever.However it can also cause stomach irritation if over used.

## 2023-08-17 PROBLEM — G93.89 BRAIN MASS: Status: ACTIVE | Noted: 2023-01-01

## 2023-08-17 PROBLEM — G93.6 VASOGENIC BRAIN EDEMA: Status: ACTIVE | Noted: 2023-01-01

## 2023-08-17 PROBLEM — E87.6 HYPOKALEMIA: Status: ACTIVE | Noted: 2023-01-01

## 2023-08-17 PROBLEM — G93.5 BRAIN COMPRESSION: Status: ACTIVE | Noted: 2023-01-01

## 2023-08-17 PROBLEM — A41.9 SEPSIS WITHOUT ACUTE ORGAN DYSFUNCTION: Status: ACTIVE | Noted: 2023-01-01

## 2023-08-17 PROBLEM — G93.5 NEOPLASM OF BRAIN CAUSING MASS EFFECT AND BRAIN COMPRESSION ON ADJACENT STRUCTURES: Status: ACTIVE | Noted: 2023-01-01

## 2023-08-17 PROBLEM — D49.6 NEOPLASM OF BRAIN CAUSING MASS EFFECT AND BRAIN COMPRESSION ON ADJACENT STRUCTURES: Status: ACTIVE | Noted: 2023-01-01

## 2023-08-17 PROBLEM — F11.20 METHADONE DEPENDENCE: Status: ACTIVE | Noted: 2023-01-01

## 2023-08-21 PROBLEM — A41.9 SEPSIS WITHOUT ACUTE ORGAN DYSFUNCTION: Status: RESOLVED | Noted: 2023-01-01 | Resolved: 2023-01-01

## 2023-08-21 PROBLEM — F17.210 TOBACCO DEPENDENCE DUE TO CIGARETTES: Status: ACTIVE | Noted: 2023-01-01

## 2023-08-21 PROBLEM — R93.89 ABNORMAL CHEST X-RAY: Status: ACTIVE | Noted: 2023-01-01

## 2023-08-21 PROBLEM — G93.5 BRAIN COMPRESSION: Status: RESOLVED | Noted: 2023-01-01 | Resolved: 2023-01-01

## 2023-08-21 PROBLEM — E78.49 OTHER HYPERLIPIDEMIA: Status: ACTIVE | Noted: 2023-01-01

## 2023-08-21 PROBLEM — G93.6 VASOGENIC BRAIN EDEMA: Status: RESOLVED | Noted: 2023-01-01 | Resolved: 2023-01-01

## 2023-08-21 PROBLEM — R79.89 ELEVATED BRAIN NATRIURETIC PEPTIDE (BNP) LEVEL: Status: ACTIVE | Noted: 2023-01-01

## 2023-08-21 PROBLEM — Z71.89 ADVANCE CARE PLANNING: Status: ACTIVE | Noted: 2023-01-01

## 2023-08-21 PROBLEM — I10 PRIMARY HYPERTENSION: Status: ACTIVE | Noted: 2023-01-01

## 2023-08-22 PROBLEM — R41.0 DISORIENTATION: Status: ACTIVE | Noted: 2023-01-01

## 2023-08-22 PROBLEM — C71.9 GBM (GLIOBLASTOMA MULTIFORME): Status: ACTIVE | Noted: 2023-01-01

## 2023-08-22 PROBLEM — R53.81 DEBILITY: Status: ACTIVE | Noted: 2023-01-01

## 2023-08-22 NOTE — TELEPHONE ENCOUNTER
----- Message from Navya Ellis NP sent at 8/22/2023  2:30 PM CDT -----  Regarding: Follow-up  Patient will need follow up in two week for staples removed post crani and 4 weeks repeat imaging (ct scan) for follow up with Dr. Encarnacion    Thanks  E

## 2023-08-22 NOTE — TELEPHONE ENCOUNTER
----- Message from Anthony Stewart MD sent at 8/22/2023  2:12 PM CDT -----  Pt admitted with newly diagnosed GBM.  Pt needs an appt with Dr Umanzor at [facility] after discharge as well as with a medical oncologist in the [facility].

## 2023-08-23 NOTE — TELEPHONE ENCOUNTER
S/W Pt daughter regarding referral to Dr. Umanzor (neuro-oncology).     Offered to set up appointment per referral from inpatient hem onc provider. Pt daughter politely declined stating they wish to f/u with [facility] because they cannot travel to [place] for appointments. Daughter reports health concerns of her own and it is their wish to stay locally for treatment. Nurse encouraged Pt daughter to do what is best for their family and stated office will be here if she does wish to seek additional care.

## 2023-09-20 NOTE — PROGRESS NOTES
"Neurosurgery History & Physical    Patient ID: Arti Cortés is a 67 y.o. female.    Chief Complaint   Patient presents with    Back Pain     Lower back pain radiates to both legs.       HPI:  Ms. Cortés is a 67 year old female who presented with vision changes and confusion.  Imaging revealed a heterogeneous right parietal and temporal mass.  In the ICU she had hemorrhagic expansion and marked neurologic decline.  She was taken for urgent craniotomy via a right occipital approach and debulking of her mass and intraparenchymal hemorrhage.  She recovered well and was able to be discharged after an extended hospital stay.  Pathology returned as glioblastoma.  She returns today for post-operative CT head.  She has not started chemotherapy and radiation yet and is working with [facility] cancer team to establish next steps in care.      Review of Systems  Denies F/C/N/V  Denies wound issues    Past Medical History:   Diagnosis Date    Chronic back pain     DVT (deep venous thrombosis)     GERD (gastroesophageal reflux disease)     HLD (hyperlipidemia)     Hypertension     Methadone dependence     Venous insufficiency (chronic) (peripheral)      Social History     Socioeconomic History    Marital status:    Tobacco Use    Smoking status: Former     Current packs/day: 1.00     Types: Cigarettes    Smokeless tobacco: Never   Substance and Sexual Activity    Alcohol use: Yes     Comment: occasional glass of wine    Drug use: Yes     Types: Marijuana     Comment: rarely     Family History   Problem Relation Age of Onset    Diabetes Mother     Diabetes Maternal Grandmother      Review of patient's allergies indicates:   Allergen Reactions    Benadryl [diphenhydramine hcl] Other (See Comments)     Pt said it made her face "draw up" and she has not taken it since     No current facility-administered medications for this visit.  No current outpatient medications on file.    Facility-Administered Medications " "Ordered in Other Visits:     acetaminophen tablet 650 mg, 650 mg, Oral, Q4H PRN, Annelise Crowe MD, 650 mg at 10/11/23 0019    albuterol inhaler 2 puff, 2 puff, Inhalation, Q4H PRN, Annelise Crowe MD    amLODIPine tablet 10 mg, 10 mg, Oral, Daily, Annelise Crowe MD, 10 mg at 10/11/23 0930    enoxaparin injection 40 mg, 40 mg, Subcutaneous, Daily, Annelise Crowe MD, 40 mg at 10/10/23 2242    gabapentin capsule 600 mg, 600 mg, Oral, TID, Annelise Crowe MD, 600 mg at 10/11/23 0931    megestroL 400 mg/10 mL (10 mL) suspension 400 mg, 400 mg, Oral, Daily, Maximo Alfaro DO, 400 mg at 10/11/23 0931    melatonin tablet 6 mg, 6 mg, Oral, Nightly PRN, Annelise Crowe MD    methadone tablet 30 mg, 30 mg, Oral, Q8H, Yee Green MD, 30 mg at 10/11/23 0930    metoprolol succinate (TOPROL-XL) 24 hr tablet 25 mg, 25 mg, Oral, Daily, Annelise Crowe MD, 25 mg at 10/11/23 0930    naloxone 0.4 mg/mL injection 0.02 mg, 0.02 mg, Intravenous, PRN, Annelise Crowe MD    ondansetron injection 4 mg, 4 mg, Intravenous, Q8H PRN, Annelise Crowe MD    pantoprazole EC tablet 40 mg, 40 mg, Oral, Daily, Annelise Crowe MD, 40 mg at 10/11/23 0930    pravastatin tablet 10 mg, 10 mg, Oral, QHS, Annelise Crowe MD, 10 mg at 10/11/23 0057    promethazine tablet 25 mg, 25 mg, Oral, QHS, Annelise Crowe MD    sodium chloride 0.9% flush 10 mL, 10 mL, Intravenous, Q12H PRN, Annelise Crowe MD    temozolomide capsule 140 mg, 140 mg, Oral, Amrita BOSCH Melissa A., MD  Blood pressure 111/73, pulse 64, resp. rate 18, height 5' 6" (1.676 m), weight 78 kg (171 lb 15.3 oz).      Neurologic Exam  AAOx4, NAD  Patient in a somber mood concerning her diagnosis   Strength 5/5 in the BU/L extremities  Wound well healed      Imaging:  CT head without contrast demonstrates considerable improvement in mass effect when compared to preop scan.      Assessment/Plan:   Ms. Cortés is a 67 year old female with recent " diagnosis of GBM, soon to start on chemotherapy and radiation.      Plan to follow-up in two months with post-radiation Mri brain with and without contrast.

## 2023-10-10 PROBLEM — J96.01 ACUTE HYPOXEMIC RESPIRATORY FAILURE: Status: ACTIVE | Noted: 2023-01-01

## 2023-10-12 PROBLEM — F11.20 METHADONE DEPENDENCE: Status: RESOLVED | Noted: 2023-01-01 | Resolved: 2023-01-01

## 2023-10-14 PROBLEM — F11.90 METHADONE USE: Status: ACTIVE | Noted: 2023-01-01

## 2023-10-18 PROBLEM — K64.8: Status: ACTIVE | Noted: 2023-01-01

## 2023-10-28 PROBLEM — G93.6 CEREBRAL EDEMA: Status: ACTIVE | Noted: 2023-01-01

## 2023-10-29 PROBLEM — D72.829 LEUKOCYTOSIS: Status: ACTIVE | Noted: 2023-01-01

## 2023-10-29 PROBLEM — R40.0 SOMNOLENCE: Status: ACTIVE | Noted: 2023-01-01

## 2023-10-29 PROBLEM — Z51.5 COMFORT MEASURES ONLY STATUS: Status: ACTIVE | Noted: 2023-01-01

## 2023-10-29 PROBLEM — C71.9 GLIOBLASTOMA: Status: ACTIVE | Noted: 2023-01-01

## 2023-10-30 PROBLEM — Z51.5 HOSPICE CARE: Status: ACTIVE | Noted: 2023-01-01
